# Patient Record
Sex: FEMALE | Race: OTHER | Employment: UNEMPLOYED | URBAN - METROPOLITAN AREA
[De-identification: names, ages, dates, MRNs, and addresses within clinical notes are randomized per-mention and may not be internally consistent; named-entity substitution may affect disease eponyms.]

---

## 2020-11-25 ENCOUNTER — APPOINTMENT (OUTPATIENT)
Dept: CT IMAGING | Age: 45
End: 2020-11-25
Attending: PHYSICIAN ASSISTANT

## 2020-11-25 ENCOUNTER — HOSPITAL ENCOUNTER (EMERGENCY)
Age: 45
Discharge: HOME | End: 2020-11-25
Attending: EMERGENCY MEDICINE

## 2020-11-25 ENCOUNTER — APPOINTMENT (OUTPATIENT)
Dept: ULTRASOUND IMAGING | Age: 45
End: 2020-11-25
Attending: PHYSICIAN ASSISTANT

## 2020-11-25 VITALS
HEART RATE: 88 BPM | TEMPERATURE: 97.6 F | OXYGEN SATURATION: 100 % | HEIGHT: 69 IN | RESPIRATION RATE: 18 BRPM | BODY MASS INDEX: 38.51 KG/M2 | SYSTOLIC BLOOD PRESSURE: 126 MMHG | DIASTOLIC BLOOD PRESSURE: 78 MMHG | WEIGHT: 260 LBS

## 2020-11-25 DIAGNOSIS — N12 PYELONEPHRITIS: ICD-10-CM

## 2020-11-25 DIAGNOSIS — K43.9 VENTRAL HERNIA WITHOUT OBSTRUCTION OR GANGRENE: Primary | ICD-10-CM

## 2020-11-25 DIAGNOSIS — K76.9 HEPATIC LESION: ICD-10-CM

## 2020-11-25 LAB
ALBUMIN SERPL-MCNC: 3.5 G/DL (ref 3.4–5)
ALBUMIN/GLOB SERPL: 0.9 {RATIO} (ref 0.8–1.7)
ALP SERPL-CCNC: 116 U/L (ref 45–117)
ALT SERPL-CCNC: 29 U/L (ref 13–56)
ANION GAP SERPL CALC-SCNC: 8 MMOL/L (ref 3–18)
APPEARANCE UR: CLEAR
AST SERPL-CCNC: 21 U/L (ref 10–38)
BACTERIA URNS QL MICRO: ABNORMAL /HPF
BASOPHILS # BLD: 0.1 K/UL (ref 0–0.1)
BASOPHILS NFR BLD: 1 % (ref 0–2)
BILIRUB SERPL-MCNC: 0.4 MG/DL (ref 0.2–1)
BILIRUB UR QL: NEGATIVE
BUN SERPL-MCNC: 16 MG/DL (ref 7–18)
BUN/CREAT SERPL: 18 (ref 12–20)
CALCIUM SERPL-MCNC: 8.8 MG/DL (ref 8.5–10.1)
CHLORIDE SERPL-SCNC: 108 MMOL/L (ref 100–111)
CO2 SERPL-SCNC: 24 MMOL/L (ref 21–32)
COLOR UR: YELLOW
CREAT SERPL-MCNC: 0.9 MG/DL (ref 0.6–1.3)
DIFFERENTIAL METHOD BLD: ABNORMAL
EOSINOPHIL # BLD: 0.3 K/UL (ref 0–0.4)
EOSINOPHIL NFR BLD: 3 % (ref 0–5)
EPITH CASTS URNS QL MICRO: ABNORMAL /LPF (ref 0–5)
ERYTHROCYTE [DISTWIDTH] IN BLOOD BY AUTOMATED COUNT: 14.2 % (ref 11.6–14.5)
GLOBULIN SER CALC-MCNC: 4.1 G/DL (ref 2–4)
GLUCOSE SERPL-MCNC: 102 MG/DL (ref 74–99)
GLUCOSE UR STRIP.AUTO-MCNC: NEGATIVE MG/DL
HCG UR QL: NEGATIVE
HCT VFR BLD AUTO: 40.4 % (ref 35–45)
HGB BLD-MCNC: 12.9 G/DL (ref 12–16)
HGB UR QL STRIP: ABNORMAL
KETONES UR QL STRIP.AUTO: NEGATIVE MG/DL
LEUKOCYTE ESTERASE UR QL STRIP.AUTO: ABNORMAL
LIPASE SERPL-CCNC: 68 U/L (ref 73–393)
LYMPHOCYTES # BLD: 4 K/UL (ref 0.9–3.6)
LYMPHOCYTES NFR BLD: 34 % (ref 21–52)
MCH RBC QN AUTO: 27.3 PG (ref 24–34)
MCHC RBC AUTO-ENTMCNC: 31.9 G/DL (ref 31–37)
MCV RBC AUTO: 85.4 FL (ref 74–97)
MONOCYTES # BLD: 0.8 K/UL (ref 0.05–1.2)
MONOCYTES NFR BLD: 7 % (ref 3–10)
NEUTS SEG # BLD: 6.6 K/UL (ref 1.8–8)
NEUTS SEG NFR BLD: 55 % (ref 40–73)
NITRITE UR QL STRIP.AUTO: NEGATIVE
PH UR STRIP: 5.5 [PH] (ref 5–8)
PLATELET # BLD AUTO: 325 K/UL (ref 135–420)
PMV BLD AUTO: 11 FL (ref 9.2–11.8)
POTASSIUM SERPL-SCNC: 3.5 MMOL/L (ref 3.5–5.5)
PROT SERPL-MCNC: 7.6 G/DL (ref 6.4–8.2)
PROT UR STRIP-MCNC: NEGATIVE MG/DL
RBC # BLD AUTO: 4.73 M/UL (ref 4.2–5.3)
RBC #/AREA URNS HPF: ABNORMAL /HPF (ref 0–5)
SODIUM SERPL-SCNC: 140 MMOL/L (ref 136–145)
SP GR UR REFRACTOMETRY: 1.02 (ref 1–1.03)
UROBILINOGEN UR QL STRIP.AUTO: 0.2 EU/DL (ref 0.2–1)
WBC # BLD AUTO: 11.7 K/UL (ref 4.6–13.2)
WBC URNS QL MICRO: ABNORMAL /HPF (ref 0–4)

## 2020-11-25 PROCEDURE — 96375 TX/PRO/DX INJ NEW DRUG ADDON: CPT

## 2020-11-25 PROCEDURE — 99284 EMERGENCY DEPT VISIT MOD MDM: CPT

## 2020-11-25 PROCEDURE — 74176 CT ABD & PELVIS W/O CONTRAST: CPT

## 2020-11-25 PROCEDURE — 85025 COMPLETE CBC W/AUTO DIFF WBC: CPT

## 2020-11-25 PROCEDURE — 87086 URINE CULTURE/COLONY COUNT: CPT

## 2020-11-25 PROCEDURE — 81025 URINE PREGNANCY TEST: CPT

## 2020-11-25 PROCEDURE — 80053 COMPREHEN METABOLIC PANEL: CPT

## 2020-11-25 PROCEDURE — 74011250636 HC RX REV CODE- 250/636: Performed by: PHYSICIAN ASSISTANT

## 2020-11-25 PROCEDURE — 96376 TX/PRO/DX INJ SAME DRUG ADON: CPT

## 2020-11-25 PROCEDURE — 83690 ASSAY OF LIPASE: CPT

## 2020-11-25 PROCEDURE — 76705 ECHO EXAM OF ABDOMEN: CPT

## 2020-11-25 PROCEDURE — 96374 THER/PROPH/DIAG INJ IV PUSH: CPT

## 2020-11-25 PROCEDURE — 81001 URINALYSIS AUTO W/SCOPE: CPT

## 2020-11-25 PROCEDURE — 96361 HYDRATE IV INFUSION ADD-ON: CPT

## 2020-11-25 RX ORDER — MORPHINE SULFATE 2 MG/ML
4 INJECTION, SOLUTION INTRAMUSCULAR; INTRAVENOUS
Status: COMPLETED | OUTPATIENT
Start: 2020-11-25 | End: 2020-11-25

## 2020-11-25 RX ORDER — ONDANSETRON 2 MG/ML
4 INJECTION INTRAMUSCULAR; INTRAVENOUS
Status: COMPLETED | OUTPATIENT
Start: 2020-11-25 | End: 2020-11-25

## 2020-11-25 RX ORDER — CEPHALEXIN 500 MG/1
1000 CAPSULE ORAL 2 TIMES DAILY
Qty: 28 CAP | Refills: 0 | Status: SHIPPED | OUTPATIENT
Start: 2020-11-25 | End: 2020-12-02

## 2020-11-25 RX ORDER — ONDANSETRON 4 MG/1
TABLET, ORALLY DISINTEGRATING ORAL
Qty: 10 TAB | Refills: 0 | Status: SHIPPED | OUTPATIENT
Start: 2020-11-25 | End: 2020-12-08

## 2020-11-25 RX ORDER — HYDROCODONE BITARTRATE AND ACETAMINOPHEN 5; 325 MG/1; MG/1
1 TABLET ORAL
Qty: 12 TAB | Refills: 0 | Status: SHIPPED | OUTPATIENT
Start: 2020-11-25 | End: 2020-11-28

## 2020-11-25 RX ADMIN — SODIUM CHLORIDE 1000 ML: 9 INJECTION, SOLUTION INTRAVENOUS at 12:35

## 2020-11-25 RX ADMIN — MORPHINE SULFATE 4 MG: 2 INJECTION, SOLUTION INTRAMUSCULAR; INTRAVENOUS at 14:12

## 2020-11-25 RX ADMIN — ONDANSETRON 4 MG: 2 INJECTION INTRAMUSCULAR; INTRAVENOUS at 12:33

## 2020-11-25 RX ADMIN — MORPHINE SULFATE 4 MG: 2 INJECTION, SOLUTION INTRAMUSCULAR; INTRAVENOUS at 12:34

## 2020-11-25 NOTE — ED PROVIDER NOTES
[facility] EMERGENCY DEPT      Date: 11/25/2020  Patient Name: June Ramos    History of Presenting Illness     Chief Complaint   Patient presents with    Abdominal Pain     41 y.o. female with a past medical history of kidney stones presents the ED via EMS complaining of diffuse abdominal pain, nausea, vomiting since yesterday. She describes having a sharp intermittent pain which is in her diffuse abdomen, more so to the right side, which radiates occasionally to her right flank. Patient was given 4 mg Zofran in route, with much improvement of her nausea. She is unsure if this feels like prior kidney stones. She noes having decreased PO intake over the past 2 days due to her discomfort. Denies any fever, chills, diarrhea, constipation, urinary complaints, vaginal symptoms, other symptoms at this time. Patient denies any other associated signs or symptoms. Patient denies any other complaints. Nursing notes regarding the HPI and triage nursing notes were reviewed. Prior medical records were reviewed. Current Outpatient Medications   Medication Sig Dispense Refill    HYDROcodone-acetaminophen (NORCO) 5-325 mg per tablet Take 1 Tab by mouth every six (6) hours as needed for Pain for up to 3 days. Max Daily Amount: 4 Tabs. 12 Tab 0    ondansetron (Zofran ODT) 4 mg disintegrating tablet Take 1-2 tablets every 6-8 hours as needed for nausea and vomiting. 10 Tab 0    cephALEXin (Keflex) 500 mg capsule Take 2 Caps by mouth two (2) times a day for 7 days. 28 Cap 0       Past History     Past Medical History:  History reviewed. No pertinent past medical history. Past Surgical History:  Past Surgical History:   Procedure Laterality Date    ABDOMEN SURGERY PROC UNLISTED         Family History:  History reviewed. No pertinent family history.     Social History:  Social History     Tobacco Use    Smoking status: Current Every Day Smoker     Packs/day: 0.50    Smokeless tobacco: Never Used   Substance Use Topics    Alcohol use: Not Currently    Drug use: Not Currently       Allergies:  No Known Allergies    Patient's primary care provider (as noted in EPIC):  None    Review of Systems   Constitutional:  Denies malaise, fever, chills. Cardiac:  Denies chest pain or palpitations. Respiratory:  Denies cough, wheezing, difficulty breathing, shortness of breath. GI/ABD:  + Abdominal pain, nausea, vomiting. :  Denies injury, pain, dysuria or urgency. Back:  + right flank pain. Neuro:  Denies headache, LOC, dizziness, neurologic symptoms/deficits/paresthesias. Skin: Denies injury, rash, itching or skin changes. All other systems negative as reviewed. Visit Vitals  /78 (BP 1 Location: Right arm, BP Patient Position: At rest)   Pulse 88   Temp 97.6 °F (36.4 °C)   Resp 18   Ht 5' 9\" (1.753 m)   Wt 117.9 kg (260 lb)   SpO2 100%   BMI 38.40 kg/m²       Patient Vitals for the past 12 hrs:   Temp Pulse Resp BP SpO2   11/25/20 1215 97.6 °F (36.4 °C) 88 18 126/78 100 %       PHYSICAL EXAM:    CONSTITUTIONAL:  Alert, appears mildly uncomfortable; well developed;  well nourished. HEAD:  Normocephalic, atraumatic. EYES:  EOMI. Non-icteric sclera. Normal conjunctiva. ENTM:  Mouth: mucous membranes moist.  NECK:  Supple  RESPIRATORY:  Chest clear, equal breath sounds, good air movement. Without wheezes, rhonchi, rales. CARDIOVASCULAR:  Regular rate and rhythm. No murmurs, rubs, or gallops. GI:  Normal bowel sounds, abdomen soft with diffuse tenderness palpation, more so to RUQ, less so to periumbilical region. Voluntary guarding present. No rebound TTP. BACK: Bilateral CVA tenderness. UPPER EXT:  Normal inspection. NEURO:  Moves all four extremities, and grossly normal motor exam.  SKIN:  No rashes;  Normal for age. PSYCH:  Alert and normal affect.     DIFFERENTIAL DIAGNOSES/ MEDICAL DECISION MAKING:  Gastritis, gerd, peptic ulcer disease, cholecystitis, pancreatitis, gastroenteritis, hepatitis, constipation related pain, appendicitis pain, diverticulitis, urinary tract infection, obstruction, abdominal wall pain, or combination of the above versus many other processes. ED COURSE AND MEDICAL DECISION MAKING:    The patient's pain improved in the ED with the noted medications. On reassessment of the patient, the patient continues to have no surgical abdomen with no rebound nor guarding. The patient does not appear septic by presentation, vital signs and laboratory results. Recent Results (from the past 12 hour(s))   CBC WITH AUTOMATED DIFF    Collection Time: 11/25/20 12:20 PM   Result Value Ref Range    WBC 11.7 4.6 - 13.2 K/uL    RBC 4.73 4.20 - 5.30 M/uL    HGB 12.9 12.0 - 16.0 g/dL    HCT 40.4 35.0 - 45.0 %    MCV 85.4 74.0 - 97.0 FL    MCH 27.3 24.0 - 34.0 PG    MCHC 31.9 31.0 - 37.0 g/dL    RDW 14.2 11.6 - 14.5 %    PLATELET 132 159 - 101 K/uL    MPV 11.0 9.2 - 11.8 FL    NEUTROPHILS 55 40 - 73 %    LYMPHOCYTES 34 21 - 52 %    MONOCYTES 7 3 - 10 %    EOSINOPHILS 3 0 - 5 %    BASOPHILS 1 0 - 2 %    ABS. NEUTROPHILS 6.6 1.8 - 8.0 K/UL    ABS. LYMPHOCYTES 4.0 (H) 0.9 - 3.6 K/UL    ABS. MONOCYTES 0.8 0.05 - 1.2 K/UL    ABS. EOSINOPHILS 0.3 0.0 - 0.4 K/UL    ABS. BASOPHILS 0.1 0.0 - 0.1 K/UL    DF AUTOMATED     METABOLIC PANEL, COMPREHENSIVE    Collection Time: 11/25/20 12:20 PM   Result Value Ref Range    Sodium 140 136 - 145 mmol/L    Potassium 3.5 3.5 - 5.5 mmol/L    Chloride 108 100 - 111 mmol/L    CO2 24 21 - 32 mmol/L    Anion gap 8 3.0 - 18 mmol/L    Glucose 102 (H) 74 - 99 mg/dL    BUN 16 7.0 - 18 MG/DL    Creatinine 0.90 0.6 - 1.3 MG/DL    BUN/Creatinine ratio 18 12 - 20      GFR est AA >60 >60 ml/min/1.73m2    GFR est non-AA >60 >60 ml/min/1.73m2    Calcium 8.8 8.5 - 10.1 MG/DL    Bilirubin, total 0.4 0.2 - 1.0 MG/DL    ALT (SGPT) 29 13 - 56 U/L    AST (SGOT) 21 10 - 38 U/L    Alk.  phosphatase 116 45 - 117 U/L    Protein, total 7.6 6.4 - 8.2 g/dL    Albumin 3.5 3.4 - 5.0 g/dL    Globulin 4.1 (H) 2.0 - 4.0 g/dL    A-G Ratio 0.9 0.8 - 1.7     LIPASE    Collection Time: 11/25/20 12:20 PM   Result Value Ref Range    Lipase 68 (L) 73 - 393 U/L   URINALYSIS W/ RFLX MICROSCOPIC    Collection Time: 11/25/20  1:40 PM   Result Value Ref Range    Color YELLOW      Appearance CLEAR      Specific gravity 1.018 1.005 - 1.030      pH (UA) 5.5 5.0 - 8.0      Protein Negative NEG mg/dL    Glucose Negative NEG mg/dL    Ketone Negative NEG mg/dL    Bilirubin Negative NEG      Blood LARGE (A) NEG      Urobilinogen 0.2 0.2 - 1.0 EU/dL    Nitrites Negative NEG      Leukocyte Esterase SMALL (A) NEG     HCG URINE, QL    Collection Time: 11/25/20  1:40 PM   Result Value Ref Range    HCG urine, QL Negative NEG     URINE MICROSCOPIC ONLY    Collection Time: 11/25/20  1:40 PM   Result Value Ref Range    WBC 4 to 10 0 - 4 /hpf    RBC 21 to 35 0 - 5 /hpf    Epithelial cells 3+ 0 - 5 /lpf    Bacteria 1+ (A) NEG /hpf      Ct Abd Pelv Wo Cont    Result Date: 11/25/2020  EXAM: CT ABDOMEN AND PELVIS (NON-CONTRAST) INDICATION: Abdominal pain radiating to the back COMPARISON: No prior comparison study TECHNIQUE: Axial CT imaging of the abdomen and pelvis was performed without IV or oral contrast as per specific clinician request.  Multiplanar reformats were generated. One or more dose reduction techniques were used on this CT: automated exposure control, adjustment of the mAs and/or kVp according to patient's size, and iterative reconstruction techniques. The specific techniques utilized on this CT exam have been documented in the patient's electronic medical record. Digital Imaging and Communications in Medicine (DICOM) format image data are available to nonaffiliated external healthcare facilities or entities on a secure, media free, reciprocally searchable basis with patient authorization for at least a 12-month period after this study.  _______________ FINDINGS: The lack of oral and IV contrast limits evaluation of the solid organs and bowel. LOWER CHEST: The visualized lung bases are clear. There is no pericardial or pleural effusion. LIVER, BILIARY: Small hepatic hypodense lesions are present, located centrally in the hepatic dome, posteriorly in the hepatic dome, inferiorly in the posterior segment right hepatic lobe, caudate lobe, and in the left hepatic lobe lateral segment where the largest lesion is located. Largest lesion has heterogeneous suggesting cyst. Others are too small to characterize. No abnormal biliary dilation. Gallbladder is unremarkable. PANCREAS: Unremarkable. SPLEEN: Unremarkable. ADRENALS: Unremarkable. KIDNEYS: Unremarkable. LYMPH NODES: No enlarged lymph nodes by size criteria. GASTROINTESTINAL TRACT: The lack of oral contrast limits bowel evaluation. No abnormal bowel dilation or wall thickening. Appendix is within normal limits. PELVIC ORGANS: Urinary bladder is incompletely distended, otherwise unremarkable. Uterus and adnexa are unremarkable. VASCULATURE: Unremarkable. OSSEOUS: Unremarkable. OTHER: Ventral abdominal wall hernia is present in the perivesicle region. Fat and omentum/mesentery is present. No bowel contents are seen. Estimated size of the defect is about 3.1 x 2.7 cm. The hernia sac has larger size, measuring about 5.2 cm AP by 8.7 cm craniocaudal by 7.8 cm width. No internal fluid contents or adjacent inflammatory changes are present. Potentially there is mild stranding within the hernia sac. _______________     IMPRESSION: 1. Prominent sized periumbilical ventral hernia containing fat and omentum/mesentery. No bowel contents are seen. 2. No other evidence of acute solid organ or bowel abnormality. 3. Small hepatic hypodensities some of which have Hounsfield units suggesting cysts. Others are too small to characterize. [facility]    Result Date: 11/25/2020  EXAM: Limited right upper quadrant abdominal ultrasound INDICATION: Right upper quadrant pain.  COMPARISON: None. TECHNIQUE: Real-time abdomen/right upper quadrant sonography in multiple planes was performed with image documentation. Grayscale, color flow Doppler imaging, and velocity spectral waveform analysis of the portal vein was performed (duplex imaging). _______________ FINDINGS: LIVER: Diffusely increased hepatic parenchymal echogenicity is present. There are several cysts noted throughout the liver, largest within the left hepatic lobe measuring approximately 2.3 cm in maximal dimension. No solid-appearing mass lesion Color flow Doppler and velocity spectral waveform analysis of the portal vein shows normal (hepatopedal) direction of flow. [place] BILIARY SYSTEM: No intrahepatic biliary dilatation. Common bile duct is upper limits of normal measuring 0.6 cm. GALLBLADDER: No gallstones or gallbladder wall thickening. No pericholecystic fluid. RIGHT KIDNEY: 11.2 cm in length. No hydronephrosis or renal mass. No visible calculi. PANCREAS: Head and body are unremarkable in appearance though the tail is obscured by overlying bowel gas. IVC: Visualized portions are unremarkable in appearance. OTHER: No free intraperitoneal fluid. _______________     IMPRESSION: 1. No acute sonographic abnormality demonstrated. 2. No evidence of cholelithiasis or biliary ductal dilatation. 3. Diffuse increased hepatic parenchymal echogenicity as can be seen with steatosis. 4. Several small hepatic cysts. .      Patient mostly having right sided abdominal pain onset yesterday with nausea, vomiting, right flank pain. Patient did have bilateral CVA tenderness. She has 4-10 WBCs and 21-35 RBCs in her urine, will cover for pyelonephritis. Urine culture was ordered, Keflex prescription provided. Patient was provided also with Norco and Zofran. She was counseled on her ventral hernia, to return immediately should she develop any worsening including worsening pain, intractable vomiting, fever, chills, any other concerning symptoms.     I discussed this patient with my attending, who agrees with the assessment and plan. Diagnosis:   1. Ventral hernia without obstruction or gangrene    2. Pyelonephritis      Disposition: Discharge    Follow-up Information     Follow up With Specialties Details Why Contact Info    Julia Barroso MD Surgery In 3 days  [address]  [place] [address]  [address] [facility] [place]   In 3 days  [address]  [address] ([facility]) [phone]    [facility] EMERGENCY DEPT Emergency Medicine  Immediately if symptoms worsen [address]  [phone]          Discharge Medication List as of 11/25/2020  3:28 PM      START taking these medications    Details   HYDROcodone-acetaminophen (NORCO) 5-325 mg per tablet Take 1 Tab by mouth every six (6) hours as needed for Pain for up to 3 days. Max Daily Amount: 4 Tabs., Normal, Disp-12 Tab,R-0      ondansetron (Zofran ODT) 4 mg disintegrating tablet Take 1-2 tablets every 6-8 hours as needed for nausea and vomiting., Normal, Disp-10 Tab,R-0      cephALEXin (Keflex) 500 mg capsule Take 2 Caps by mouth two (2) times a day for 7 days. , Normal, Disp-28 Cap,R-0           NIKHIL Chavez

## 2020-11-25 NOTE — ED TRIAGE NOTES
Patient arrived via EMS c/o abdominal pain that radiates to her back, nausea, and vomiting that began yesterday. Received 4mg zofran IV en route.

## 2020-11-25 NOTE — DISCHARGE INSTRUCTIONS
Patient Education        Hernia: Instrucciones de cuidado  Hernia: Care Instructions  Instrucciones de Eliazar Hilt hernia aparece cuando el tejido sobresale a través de irina khadijah débil en la pared de adams vientre. La khadijah de la janet y del ombligo son zonas comunes para irina hernia. Irina hernia también puede aparecer cerca de la khadijah de irina cirugía que tuvo anteriormente. La presión de levantar objetos, estirarse demasiado o toser puede desgarrar la khadijah débil, lo que hace que la hernia sobresalga y cause dolor. Si no puede poner la hernia en adams sitio, el tejido podría quedar atrapado fuera de la pared del vientre. Si la hernia se tuerce y pierde adams aporte de nishant, se hinchará y morirá. A esto se le llama hernia estrangulada. Suele causar [employer] [facility]. Necesita tratamiento de inmediato. Algunas hernias necesitan repararse para prevenir que se estrangulen. Si adams hernia le causa síntomas o es [address] [facility], es posible que necesite [facility]. La atención de seguimiento es irina parte clave de adams tratamiento y seguridad. Asegúrese de hacer y acudir a todas las citas, y llame a adams médico si está teniendo problemas. También es irina buena idea saber los resultados de bobbi exámenes y mantener irina lista de los medicamentos que richelle. ¿Cómo puede cuidarse en el hogar? · Tenga cuidado al levantar objetos pesados. · Mantenga un peso saludable. · No fume. Fumar puede provocar tos, lo cual puede hacer que sobresalga la hernia. Si necesita ayuda para dejar de fumar, hable con adams médico [employer] y medicamentos para dejar de fumar. Éstos pueden aumentar bobbi probabilidades de dejar el hábito para siempre. · Hable con adams médico antes de usar un corsé o un braguero para irina hernia. No se recomiendan estos aparatos para tratar las hernias y a veces pueden hacer más daño que beneficio. Podrían chapin beenaertos Erica Bowen que adams médico piense que un braguero podría funcionar, nic estos casos son poco comunes.   ¿Cuándo debe pedir ayuda? Llame a adams médico ahora mismo o busque atención médica inmediata si:    · Tiene dolor repentino e intenso en la khadijah de la hernia.     · Vomita o tiene náuseas.     · Tiene dolor abdominal y no está pasando gases ni heces.     · No puede empujar adams hernia hacia adams lugar con presión suave cuando se recuesta.     · La piel que cubre la hernia se enrojece o se pone sensible. Preste especial atención a los cambios en adams ami y asegúrese de comunicarse con adams médico si:    · Tiene dolor nuevo o más intenso.     · No mejora daron se esperaba. ¿Dónde puede encontrar más información en inglés? Vaya a [email or web address].[email or web address]/  Lissett Pereira C129 en la búsqueda para aprender más acerca de \"Hernia: Instrucciones de cuidado. \"  Revisado: 15 adelfoil, 2020               Versión del contenido: 12.6  © 9640-8099 Healthwise, Incorporated. Las instrucciones de cuidado fueron adaptadas bajo licencia por [employer] (which disclaims liability or warranty for this information). Si usted tiene [place] afección médica o sobre estas instrucciones, siempre pregunte a adams profesional de ami. Healthwise, Incorporated niega toda garantía o responsabilidad por adams uso de esta información. Patient Education        Infección renal: Instrucciones de cuidado  Kidney Infection: Care Instructions  Instrucciones de cuidado    Irina infección renal (pielonefritis) es un tipo de infección urinaria (UTI, por bobbi siglas en inglés). La mayoría de las UTI son infecciones de la vejiga. Las infecciones renales tienden a enfermar más a las personas que las infecciones de la vejiga. [place] son más graves porque pueden causar daños duraderos si no se tratan con rapidez. La atención de seguimiento es irina parte clave de adams tratamiento y seguridad. Asegúrese de hacer y acudir a todas las citas, y llame a adams médico si está teniendo problemas.  También es irina buena idea [employer] resultados de bobbi exámenes y mantener irina lista de los medicamentos que richelle. ¿Cómo puede cuidarse en el hogar? · [address] No deje de tomarlos por el hecho de sentirse mejor. Debe anna todos los antibióticos hasta terminarlos. · Francine abundante agua, lo suficiente para que adams orina sea de color amarillo elida o transparente daron el agua. [place] puede ayudar a eliminar las bacterias que causan la infección. Si tiene [employer], del corazón o del hígado y tiene que Cheri's líquidos, hable con adams médico antes de aumentar adams consumo. · Orine con frecuencia. Trate de vaciar la vejiga cada vez que orine. · Para aliviar el dolor, tome irina ducha caliente o colóquese irina almohadilla térmica (a baja temperatura) sobre la parte baja del abdomen. Nunca se duerma mientras [place] irina almohadilla térmica. Póngase un paño stallworth entre la almohadilla térmica y la piel. Cómo ayudar a prevenir las infecciones renales  · Francine abundante agua todos los GRASSE. [place] le ayuda a orinar con frecuencia, lo que elimina las bacterias del organismo. Si tiene [employer], del corazón o del hígado y tiene que [place] líquidos, hable con adams médico antes de aumentar adams consumo. · Orine en cuanto sienta la necesidad de hacerlo. No retenga la [employer]. Orine antes de irse a dormir. · Si usted presenta síntomas de infección en la vejiga, daron ardor al orinar u orinar con Dallie Acron a adams médico para que trate el problema antes de que empeore. Si no trata irina infección de vejiga de inmediato, puede extenderse al riñón. · Los hombres deben mantener limpia la punta del pene. Si es melodie, tenga en cuenta estas ideas:  · Orine inmediatamente después de chapin tenido [employer]. · Cámbiese las toallas sanitarias con frecuencia.  Evite el uso de lavados vaginales, los [place] de higiene femenina y otros productos para la higiene femenina que contengan desodorantes. · Después de ir al baño, límpiese de adelante hacia atrás. ¿Cuándo debe pedir ayuda? Llame a adams médico ahora mismo o busque atención médica inmediata si:    · Tiene dolor en aumento en la espalda alka debajo de las [place] se llama dolor en el flanco.     · Tiene fiebre nueva o más jv y escalofríos.     · Tiene vómito o náuseas. Preste especial atención a los cambios en adams ami y asegúrese de comunicarse con adams médico si:    · Los síntomas, daron el ardor al orinar, Paulette [place] a aparecer.     · No está mejorando después de 2 días. ¿Dónde puede encontrar más información en inglés? Vitaliy Corey a http://www.gray.com/  Genevive Carota I168 en la búsqueda para aprender más acerca de \"Infección renal: Instrucciones de cuidado. \"  Revisado: 15 adelfo, 2020               Versión del contenido: 12.6  © 2006-2020 Healthwise, Incorporated. Las instrucciones de cuidado fueron adaptadas bajo licencia por [employer] (which disclaims liability or warranty for this information). Si usted tiene [place] afección médica o sobre estas instrucciones, siempre pregunte a adams profesional de ami. Healthwise, Incorporated niega toda garantía o responsabilidad por adams uso de esta información.

## 2020-11-27 LAB
BACTERIA SPEC CULT: NORMAL
CC UR VC: NORMAL
SERVICE CMNT-IMP: NORMAL

## 2020-11-30 ENCOUNTER — APPOINTMENT (OUTPATIENT)
Dept: CT IMAGING | Age: 45
DRG: 355 | End: 2020-11-30
Attending: EMERGENCY MEDICINE
Payer: MEDICARE

## 2020-11-30 ENCOUNTER — HOSPITAL ENCOUNTER (INPATIENT)
Age: 45
LOS: 1 days | Discharge: HOME | DRG: 355 | End: 2020-12-02
Attending: EMERGENCY MEDICINE | Admitting: SURGERY
Payer: MEDICARE

## 2020-11-30 DIAGNOSIS — K43.0 INCARCERATED INCISIONAL HERNIA: ICD-10-CM

## 2020-11-30 DIAGNOSIS — R10.33 ABDOMINAL PAIN, ACUTE, PERIUMBILICAL: Primary | ICD-10-CM

## 2020-11-30 DIAGNOSIS — K43.9 VENTRAL HERNIA WITHOUT OBSTRUCTION OR GANGRENE: ICD-10-CM

## 2020-11-30 LAB
ALBUMIN SERPL-MCNC: 3.6 G/DL (ref 3.4–5)
ALBUMIN/GLOB SERPL: 0.9 {RATIO} (ref 0.8–1.7)
ALP SERPL-CCNC: 115 U/L (ref 45–117)
ALT SERPL-CCNC: 36 U/L (ref 13–56)
ANION GAP SERPL CALC-SCNC: 6 MMOL/L (ref 3–18)
APPEARANCE UR: CLEAR
AST SERPL-CCNC: 25 U/L (ref 10–38)
BACTERIA URNS QL MICRO: NEGATIVE /HPF
BASOPHILS # BLD: 0.1 K/UL (ref 0–0.1)
BASOPHILS NFR BLD: 1 % (ref 0–2)
BILIRUB SERPL-MCNC: 0.2 MG/DL (ref 0.2–1)
BILIRUB UR QL: NEGATIVE
BUN SERPL-MCNC: 17 MG/DL (ref 7–18)
BUN/CREAT SERPL: 13 (ref 12–20)
CALCIUM SERPL-MCNC: 9.3 MG/DL (ref 8.5–10.1)
CHLORIDE SERPL-SCNC: 107 MMOL/L (ref 100–111)
CO2 SERPL-SCNC: 27 MMOL/L (ref 21–32)
COLOR UR: YELLOW
CREAT SERPL-MCNC: 1.26 MG/DL (ref 0.6–1.3)
DIFFERENTIAL METHOD BLD: ABNORMAL
EOSINOPHIL # BLD: 0.5 K/UL (ref 0–0.4)
EOSINOPHIL NFR BLD: 5 % (ref 0–5)
EPITH CASTS URNS QL MICRO: NORMAL /LPF (ref 0–5)
ERYTHROCYTE [DISTWIDTH] IN BLOOD BY AUTOMATED COUNT: 14.7 % (ref 11.6–14.5)
GLOBULIN SER CALC-MCNC: 4.1 G/DL (ref 2–4)
GLUCOSE SERPL-MCNC: 85 MG/DL (ref 74–99)
GLUCOSE UR STRIP.AUTO-MCNC: NEGATIVE MG/DL
HCG UR QL: NEGATIVE
HCT VFR BLD AUTO: 40.3 % (ref 35–45)
HGB BLD-MCNC: 12.6 G/DL (ref 12–16)
HGB UR QL STRIP: ABNORMAL
KETONES UR QL STRIP.AUTO: ABNORMAL MG/DL
LEUKOCYTE ESTERASE UR QL STRIP.AUTO: NEGATIVE
LIPASE SERPL-CCNC: 70 U/L (ref 73–393)
LYMPHOCYTES # BLD: 4.3 K/UL (ref 0.9–3.6)
LYMPHOCYTES NFR BLD: 40 % (ref 21–52)
MCH RBC QN AUTO: 27.1 PG (ref 24–34)
MCHC RBC AUTO-ENTMCNC: 31.3 G/DL (ref 31–37)
MCV RBC AUTO: 86.7 FL (ref 74–97)
MONOCYTES # BLD: 1.1 K/UL (ref 0.05–1.2)
MONOCYTES NFR BLD: 10 % (ref 3–10)
NEUTS SEG # BLD: 4.8 K/UL (ref 1.8–8)
NEUTS SEG NFR BLD: 44 % (ref 40–73)
NITRITE UR QL STRIP.AUTO: NEGATIVE
PH UR STRIP: 5 [PH] (ref 5–8)
PLATELET # BLD AUTO: 350 K/UL (ref 135–420)
PMV BLD AUTO: 11.6 FL (ref 9.2–11.8)
POTASSIUM SERPL-SCNC: 4.4 MMOL/L (ref 3.5–5.5)
PROT SERPL-MCNC: 7.7 G/DL (ref 6.4–8.2)
PROT UR STRIP-MCNC: NEGATIVE MG/DL
RBC # BLD AUTO: 4.65 M/UL (ref 4.2–5.3)
RBC #/AREA URNS HPF: NORMAL /HPF (ref 0–5)
SODIUM SERPL-SCNC: 140 MMOL/L (ref 136–145)
SP GR UR REFRACTOMETRY: 1.02 (ref 1–1.03)
UROBILINOGEN UR QL STRIP.AUTO: 1 EU/DL (ref 0.2–1)
WBC # BLD AUTO: 10.7 K/UL (ref 4.6–13.2)
WBC URNS QL MICRO: NORMAL /HPF (ref 0–4)

## 2020-11-30 PROCEDURE — 74177 CT ABD & PELVIS W/CONTRAST: CPT

## 2020-11-30 PROCEDURE — 74011000636 HC RX REV CODE- 636: Performed by: EMERGENCY MEDICINE

## 2020-11-30 PROCEDURE — 87086 URINE CULTURE/COLONY COUNT: CPT

## 2020-11-30 PROCEDURE — 99284 EMERGENCY DEPT VISIT MOD MDM: CPT

## 2020-11-30 PROCEDURE — 74011250636 HC RX REV CODE- 250/636: Performed by: EMERGENCY MEDICINE

## 2020-11-30 PROCEDURE — 81001 URINALYSIS AUTO W/SCOPE: CPT

## 2020-11-30 PROCEDURE — 96375 TX/PRO/DX INJ NEW DRUG ADDON: CPT

## 2020-11-30 PROCEDURE — 96374 THER/PROPH/DIAG INJ IV PUSH: CPT

## 2020-11-30 PROCEDURE — 85025 COMPLETE CBC W/AUTO DIFF WBC: CPT

## 2020-11-30 PROCEDURE — 81025 URINE PREGNANCY TEST: CPT

## 2020-11-30 PROCEDURE — 96376 TX/PRO/DX INJ SAME DRUG ADON: CPT

## 2020-11-30 PROCEDURE — 83690 ASSAY OF LIPASE: CPT

## 2020-11-30 PROCEDURE — 80053 COMPREHEN METABOLIC PANEL: CPT

## 2020-11-30 RX ORDER — HYDROMORPHONE HYDROCHLORIDE 1 MG/ML
0.5 INJECTION, SOLUTION INTRAMUSCULAR; INTRAVENOUS; SUBCUTANEOUS
Status: COMPLETED | OUTPATIENT
Start: 2020-11-30 | End: 2020-11-30

## 2020-11-30 RX ORDER — KETOROLAC TROMETHAMINE 30 MG/ML
30 INJECTION, SOLUTION INTRAMUSCULAR; INTRAVENOUS
Status: COMPLETED | OUTPATIENT
Start: 2020-11-30 | End: 2020-11-30

## 2020-11-30 RX ORDER — FAMOTIDINE 10 MG/ML
20 INJECTION INTRAVENOUS
Status: COMPLETED | OUTPATIENT
Start: 2020-11-30 | End: 2020-11-30

## 2020-11-30 RX ADMIN — IOPAMIDOL 100 ML: 612 INJECTION, SOLUTION INTRAVENOUS at 23:04

## 2020-11-30 RX ADMIN — SODIUM CHLORIDE 1000 ML: 900 INJECTION, SOLUTION INTRAVENOUS at 20:59

## 2020-11-30 RX ADMIN — FAMOTIDINE 20 MG: 10 INJECTION, SOLUTION INTRAVENOUS at 21:00

## 2020-11-30 RX ADMIN — HYDROMORPHONE HYDROCHLORIDE 0.5 MG: 1 INJECTION, SOLUTION INTRAMUSCULAR; INTRAVENOUS; SUBCUTANEOUS at 21:00

## 2020-11-30 RX ADMIN — KETOROLAC TROMETHAMINE 30 MG: 30 INJECTION, SOLUTION INTRAMUSCULAR at 21:00

## 2020-11-30 RX ADMIN — HYDROMORPHONE HYDROCHLORIDE 0.5 MG: 1 INJECTION, SOLUTION INTRAMUSCULAR; INTRAVENOUS; SUBCUTANEOUS at 22:26

## 2020-12-01 PROBLEM — R10.33 ABDOMINAL PAIN, ACUTE, PERIUMBILICAL: Status: ACTIVE | Noted: 2020-12-01

## 2020-12-01 PROBLEM — K43.9 VENTRAL HERNIA WITHOUT OBSTRUCTION OR GANGRENE: Status: ACTIVE | Noted: 2020-12-01

## 2020-12-01 LAB
COVID-19 RAPID TEST, COVR: NOT DETECTED
HEALTH STATUS, XMCV2T: NORMAL
SOURCE, COVRS: NORMAL
SPECIMEN TYPE, XMCV1T: NORMAL

## 2020-12-01 PROCEDURE — 74011250636 HC RX REV CODE- 250/636: Performed by: SURGERY

## 2020-12-01 PROCEDURE — 99218 HC RM OBSERVATION: CPT

## 2020-12-01 PROCEDURE — 74011000250 HC RX REV CODE- 250: Performed by: SURGERY

## 2020-12-01 PROCEDURE — 96376 TX/PRO/DX INJ SAME DRUG ADON: CPT

## 2020-12-01 PROCEDURE — 2709999900 HC NON-CHARGEABLE SUPPLY

## 2020-12-01 PROCEDURE — C9113 INJ PANTOPRAZOLE SODIUM, VIA: HCPCS | Performed by: SURGERY

## 2020-12-01 PROCEDURE — 74011000258 HC RX REV CODE- 258: Performed by: SURGERY

## 2020-12-01 PROCEDURE — 96375 TX/PRO/DX INJ NEW DRUG ADDON: CPT

## 2020-12-01 PROCEDURE — 99222 1ST HOSP IP/OBS MODERATE 55: CPT | Performed by: SURGERY

## 2020-12-01 PROCEDURE — 87635 SARS-COV-2 COVID-19 AMP PRB: CPT

## 2020-12-01 RX ORDER — HYDROMORPHONE HYDROCHLORIDE 1 MG/ML
0.5 INJECTION, SOLUTION INTRAMUSCULAR; INTRAVENOUS; SUBCUTANEOUS
Status: DISCONTINUED | OUTPATIENT
Start: 2020-12-01 | End: 2020-12-02 | Stop reason: HOSPADM

## 2020-12-01 RX ORDER — ONDANSETRON 2 MG/ML
4 INJECTION INTRAMUSCULAR; INTRAVENOUS
Status: DISCONTINUED | OUTPATIENT
Start: 2020-12-01 | End: 2020-12-02 | Stop reason: HOSPADM

## 2020-12-01 RX ORDER — KETOROLAC TROMETHAMINE 15 MG/ML
15 INJECTION, SOLUTION INTRAMUSCULAR; INTRAVENOUS
Status: DISCONTINUED | OUTPATIENT
Start: 2020-12-01 | End: 2020-12-02 | Stop reason: HOSPADM

## 2020-12-01 RX ORDER — DEXTROSE, SODIUM CHLORIDE, AND POTASSIUM CHLORIDE 5; .45; .15 G/100ML; G/100ML; G/100ML
50 INJECTION INTRAVENOUS CONTINUOUS
Status: DISCONTINUED | OUTPATIENT
Start: 2020-12-01 | End: 2020-12-02 | Stop reason: HOSPADM

## 2020-12-01 RX ADMIN — HYDROMORPHONE HYDROCHLORIDE 0.5 MG: 1 INJECTION, SOLUTION INTRAMUSCULAR; INTRAVENOUS; SUBCUTANEOUS at 19:38

## 2020-12-01 RX ADMIN — SODIUM CHLORIDE 40 MG: 9 INJECTION, SOLUTION INTRAMUSCULAR; INTRAVENOUS; SUBCUTANEOUS at 22:02

## 2020-12-01 RX ADMIN — POTASSIUM CHLORIDE, DEXTROSE MONOHYDRATE AND SODIUM CHLORIDE 150 ML/HR: 150; 5; 450 INJECTION, SOLUTION INTRAVENOUS at 07:29

## 2020-12-01 RX ADMIN — POTASSIUM CHLORIDE, DEXTROSE MONOHYDRATE AND SODIUM CHLORIDE 50 ML/HR: 150; 5; 450 INJECTION, SOLUTION INTRAVENOUS at 22:02

## 2020-12-01 RX ADMIN — HYDROMORPHONE HYDROCHLORIDE 0.5 MG: 1 INJECTION, SOLUTION INTRAMUSCULAR; INTRAVENOUS; SUBCUTANEOUS at 04:57

## 2020-12-01 RX ADMIN — PIPERACILLIN AND TAZOBACTAM 3.38 G: 3; .375 INJECTION, POWDER, LYOPHILIZED, FOR SOLUTION INTRAVENOUS at 09:23

## 2020-12-01 RX ADMIN — KETOROLAC TROMETHAMINE 15 MG: 15 INJECTION, SOLUTION INTRAMUSCULAR; INTRAVENOUS at 18:58

## 2020-12-01 RX ADMIN — POTASSIUM CHLORIDE, DEXTROSE MONOHYDRATE AND SODIUM CHLORIDE 150 ML/HR: 150; 5; 450 INJECTION, SOLUTION INTRAVENOUS at 00:37

## 2020-12-01 RX ADMIN — PIPERACILLIN AND TAZOBACTAM 3.38 G: 3; .375 INJECTION, POWDER, LYOPHILIZED, FOR SOLUTION INTRAVENOUS at 22:02

## 2020-12-01 RX ADMIN — SODIUM CHLORIDE 40 MG: 9 INJECTION, SOLUTION INTRAMUSCULAR; INTRAVENOUS; SUBCUTANEOUS at 09:19

## 2020-12-01 RX ADMIN — PIPERACILLIN AND TAZOBACTAM 3.38 G: 3; .375 INJECTION, POWDER, LYOPHILIZED, FOR SOLUTION INTRAVENOUS at 15:10

## 2020-12-01 RX ADMIN — HYDROMORPHONE HYDROCHLORIDE 0.5 MG: 1 INJECTION, SOLUTION INTRAMUSCULAR; INTRAVENOUS; SUBCUTANEOUS at 23:03

## 2020-12-01 RX ADMIN — HYDROMORPHONE HYDROCHLORIDE 0.5 MG: 1 INJECTION, SOLUTION INTRAMUSCULAR; INTRAVENOUS; SUBCUTANEOUS at 09:20

## 2020-12-01 NOTE — PROGRESS NOTES
Patient received in bed awake. Patient A&O x4, denies pain and discomfort. NPO after MN. No distress noted. Frequently use items within reach. Bed locked in low position. Call bell within reach and Patient verbalized understanding of use for assistance and needs. 4817- Patient awake and was given Dilaudid 0.5 mg IV for abd pain 8/10. See MAR and pain assessments. Call bell w/in reach. 1106- Patient awake. Reassessment completed, no change in patient condition. Call bell w/in reach. 1510- Patient awake. Reassessment completed, no change in patient condition.  at bedside. Call bell w/in reach.

## 2020-12-01 NOTE — ROUTINE PROCESS
TRANSFER - IN REPORT: 
 
[facility] on Isabelle Jimenez  being received from ED (unit) for routine progression of care Report consisted of patients Situation, Background, Assessment and  
Recommendations(SBAR). Information from the following report(s) SBAR, Kardex, ED Summary, Intake/Output, MAR and Recent Results was reviewed with the receiving nurse. Opportunity for questions and clarification was provided. Assessment completed upon patients arrival to unit and care assumed. Skin assessment completed with Miguel Cook RN. No noted open areas. Alert and oriented times four. Denies pain and discomfort at this time. Noted elevated blood pressure after ambulating to the bed from the wheelchair. Stated that she was cold. Provided blankets.  
 
Faith Krishnamurthy RN, BSN

## 2020-12-01 NOTE — PROGRESS NOTES
Problem: Falls - Risk of  Goal: *Absence of Falls  Description: Document Sissy Navarroasin Fall Risk and appropriate interventions in the flowsheet.   Outcome: Progressing Towards Goal  Note: Fall Risk Interventions:            Medication Interventions: Evaluate medications/consider consulting pharmacy, Patient to call before getting OOB, Teach patient to arise slowly                   Problem: Patient Education: Go to Patient Education Activity  Goal: Patient/Family Education  Outcome: Progressing Towards Goal     Problem: Pain  Goal: *Control of Pain  Outcome: Progressing Towards Goal     Problem: Surgical Pathway Post-Op Day 1  Goal: Activity/Safety  Outcome: Progressing Towards Goal

## 2020-12-01 NOTE — ED PROVIDER NOTES
Hector Arellano is a 41 y.o. female returns for worsening mid abdominal pain. Patient was here on the 25th for abdominal pain was noted to have a fat-containing ventral hernia on CT and was treated for kidney infection as well. Patient states she still having frequency and urgency and mid and upper abdominal pain along with back pain as well. Patient did vomit once today. She denies any fever or diarrhea. She has passed gas and had a bowel movement without difficulty. No blood in her stool or melena. She has had no cough or chest pain. She took some Zofran today without relief. She has been taking the antibiotics as well. Patient has remote history of umbilical hernia repair in the past.  The pain is sharp and stabbing and worse with movement and palpation. The history is provided by the patient. No past medical history on file. Past Surgical History:   Procedure Laterality Date    ABDOMEN SURGERY PROC UNLISTED           No family history on file.     Social History     Socioeconomic History    Marital status:      Spouse name: Not on file    Number of children: Not on file    Years of education: Not on file    Highest education level: Not on file   Occupational History    Not on file   Social Needs    Financial resource strain: Not on file    Food insecurity     Worry: Not on file     Inability: Not on file    Transportation needs     Medical: Not on file     Non-medical: Not on file   Tobacco Use    Smoking status: Current Every Day Smoker     Packs/day: 0.50    Smokeless tobacco: Never Used   Substance and Sexual Activity    Alcohol use: Not Currently    Drug use: Not Currently    Sexual activity: Yes   Lifestyle    Physical activity     Days per week: Not on file     Minutes per session: Not on file    Stress: Not on file   Relationships    Social connections     Talks on phone: Not on file     Gets together: Not on file     Attends Qu[employer] service: Not on file Active member of club or organization: Not on file     Attends meetings of clubs or organizations: Not on file     Relationship status: Not on file    Intimate partner violence     Fear of current or ex partner: Not on file     Emotionally abused: Not on file     Physically abused: Not on file     Forced sexual activity: Not on file   Other Topics Concern    Not on file   Social History Narrative    Not on file         ALLERGIES: Patient has no known allergies. Review of Systems   Constitutional: Positive for appetite change. Negative for fever. HENT: Negative for sore throat. Eyes: Negative for visual disturbance. Respiratory: Negative for cough and shortness of breath. Cardiovascular: Negative for chest pain. Gastrointestinal: Positive for abdominal distention, abdominal pain, nausea and vomiting. Negative for blood in stool, constipation and diarrhea. Endocrine: Negative for polyuria. Genitourinary: Positive for dysuria and frequency. Negative for difficulty urinating and hematuria. Musculoskeletal: Positive for back pain. Negative for gait problem. Skin: Negative for rash. Allergic/Immunologic: Negative for immunocompromised state. Neurological: Negative for syncope. Psychiatric/Behavioral: Positive for sleep disturbance. Vitals:    11/30/20 2145 11/30/20 2200 11/30/20 2215 11/30/20 2230   BP: 134/84 128/82 (!) 148/99 (!) 133/93   Pulse:       Resp:       Temp:       SpO2: 97% 98% 100% 94%   Weight:       Height:                Physical Exam  Vitals signs and nursing note reviewed. Constitutional:       General: She is in acute distress. Appearance: She is well-developed. She is obese. She is ill-appearing. She is not toxic-appearing or diaphoretic. HENT:      Head: Normocephalic and atraumatic.       Right Ear: External ear normal.      Left Ear: External ear normal.      Nose: Nose normal.      Mouth/Throat:      Mouth: Mucous membranes are moist.      Pharynx: Uvula midline. Eyes:      General: No scleral icterus. Conjunctiva/sclera: Conjunctivae normal.   Neck:      Musculoskeletal: Neck supple. Cardiovascular:      Rate and Rhythm: Normal rate and regular rhythm. Heart sounds: Normal heart sounds. Pulmonary:      Effort: Pulmonary effort is normal.      Breath sounds: Normal breath sounds. Abdominal:      General: Abdomen is protuberant. There is distension. Palpations: Abdomen is soft. There is no hepatomegaly or splenomegaly. Tenderness: There is abdominal tenderness in the epigastric area and periumbilical area. There is right CVA tenderness and guarding. There is no left CVA tenderness or rebound. Negative signs include Smith's sign and McBurney's sign. Hernia: A hernia is present. Hernia is present in the ventral area. Musculoskeletal:      Right lower leg: No edema. Left lower leg: No edema. Skin:     General: Skin is warm and dry. Capillary Refill: Capillary refill takes less than 2 seconds. Neurological:      Mental Status: She is alert and oriented to person, place, and time.       Gait: Gait normal.   Psychiatric:         Behavior: Behavior normal.          MDM       Procedures    Vitals:  Patient Vitals for the past 12 hrs:   Temp Pulse Resp BP SpO2   11/30/20 2230    (!) 133/93 94 %   11/30/20 2215    (!) 148/99 100 %   11/30/20 2200    128/82 98 %   11/30/20 2145    134/84 97 %   11/30/20 2130    130/77 96 %   11/30/20 2120    (!) 141/83 97 %   11/30/20 2115     97 %   11/30/20 2024 98 °F (36.7 °C) 88 20 114/65 100 %         Medications ordered:   Medications   dextrose 5% - 0.45% NaCl with KCl 20 mEq/L infusion (has no administration in time range)   HYDROmorphone (DILAUDID) syringe 0.5 mg (has no administration in time range)   ondansetron (ZOFRAN) injection 4 mg (has no administration in time range)   ketorolac (TORADOL) injection 15 mg (has no administration in time range)   sodium chloride 0.9 % bolus infusion 1,000 mL (1,000 mL IntraVENous New Bag 11/30/20 2059)   sodium chloride 0.9 % bolus infusion 1,000 mL (1,000 mL IntraVENous New Bag 11/30/20 2059)   ketorolac (TORADOL) injection 30 mg (30 mg IntraVENous Given 11/30/20 2100)   famotidine (PF) (PEPCID) injection 20 mg (20 mg IntraVENous Given 11/30/20 2100)   HYDROmorphone (DILAUDID) syringe 0.5 mg (0.5 mg IntraVENous Given 11/30/20 2100)   HYDROmorphone (DILAUDID) syringe 0.5 mg (0.5 mg IntraVENous Given 11/30/20 2226)   iopamidoL (ISOVUE 300) 61 % contrast injection 100 mL (100 mL IntraVENous Given 11/30/20 2304)         Lab findings:  Recent Results (from the past 12 hour(s))   URINALYSIS W/ RFLX MICROSCOPIC    Collection Time: 11/30/20  8:27 PM   Result Value Ref Range    Color YELLOW      Appearance CLEAR      Specific gravity 1.024 1.005 - 1.030      pH (UA) 5.0 5.0 - 8.0      Protein Negative NEG mg/dL    Glucose Negative NEG mg/dL    Ketone TRACE (A) NEG mg/dL    Bilirubin Negative NEG      Blood LARGE (A) NEG      Urobilinogen 1.0 0.2 - 1.0 EU/dL    Nitrites Negative NEG      Leukocyte Esterase Negative NEG     HCG URINE, QL    Collection Time: 11/30/20  8:27 PM   Result Value Ref Range    HCG urine, QL Negative NEG     URINE MICROSCOPIC ONLY    Collection Time: 11/30/20  8:27 PM   Result Value Ref Range    WBC 0 to 3 0 - 4 /hpf    RBC 4 to 10 0 - 5 /hpf    Epithelial cells 3+ 0 - 5 /lpf    Bacteria Negative NEG /hpf   CBC WITH AUTOMATED DIFF    Collection Time: 11/30/20  8:50 PM   Result Value Ref Range    WBC 10.7 4.6 - 13.2 K/uL    RBC 4.65 4.20 - 5.30 M/uL    HGB 12.6 12.0 - 16.0 g/dL    HCT 40.3 35.0 - 45.0 %    MCV 86.7 74.0 - 97.0 FL    MCH 27.1 24.0 - 34.0 PG    MCHC 31.3 31.0 - 37.0 g/dL    RDW 14.7 (H) 11.6 - 14.5 %    PLATELET 104 573 - 699 K/uL    MPV 11.6 9.2 - 11.8 FL    NEUTROPHILS 44 40 - 73 %    LYMPHOCYTES 40 21 - 52 %    MONOCYTES 10 3 - 10 %    EOSINOPHILS 5 0 - 5 %    BASOPHILS 1 0 - 2 %    ABS.  NEUTROPHILS 4. 8 1.8 - 8.0 K/UL    ABS. LYMPHOCYTES 4.3 (H) 0.9 - 3.6 K/UL    ABS. MONOCYTES 1.1 0.05 - 1.2 K/UL    ABS. EOSINOPHILS 0.5 (H) 0.0 - 0.4 K/UL    ABS. BASOPHILS 0.1 0.0 - 0.1 K/UL    DF AUTOMATED     METABOLIC PANEL, COMPREHENSIVE    Collection Time: 11/30/20  8:50 PM   Result Value Ref Range    Sodium 140 136 - 145 mmol/L    Potassium 4.4 3.5 - 5.5 mmol/L    Chloride 107 100 - 111 mmol/L    CO2 27 21 - 32 mmol/L    Anion gap 6 3.0 - 18 mmol/L    Glucose 85 74 - 99 mg/dL    BUN 17 7.0 - 18 MG/DL    Creatinine 1.26 0.6 - 1.3 MG/DL    BUN/Creatinine ratio 13 12 - 20      GFR est AA 56 (L) >60 ml/min/1.73m2    GFR est non-AA 46 (L) >60 ml/min/1.73m2    Calcium 9.3 8.5 - 10.1 MG/DL    Bilirubin, total 0.2 0.2 - 1.0 MG/DL    ALT (SGPT) 36 13 - 56 U/L    AST (SGOT) 25 10 - 38 U/L    Alk. phosphatase 115 45 - 117 U/L    Protein, total 7.7 6.4 - 8.2 g/dL    Albumin 3.6 3.4 - 5.0 g/dL    Globulin 4.1 (H) 2.0 - 4.0 g/dL    A-G Ratio 0.9 0.8 - 1.7     LIPASE    Collection Time: 11/30/20  8:50 PM   Result Value Ref Range    Lipase 70 (L) 73 - 393 U/L       EKG interpretation by ED Physician:      X-Ray, CT or other radiology findings or impressions:  CT ABD PELV W CONT    (Results Pending)   Ventral abdominal wall hernia containing fat and omentum is similar size to the 11/25/20 exam. There is mildly increased stranding withing the hernia sac, but no internal fluid and no bowel loops protruding in. No evidence for obstruction. Other chronic or incidental findings will be described in final report. Progress notes, Consult notes or additional Procedure notes:   10:20 PM  Patient still having a significant pain and is significantly tender in the periumbilical area. Hernia feels reducible. Rest abdomen is soft with no rigidity. Labs are unremarkable. No evidence of UTI.   We will get repeat CT to evaluate for possible incarceration or strangulation    Patient is more comfortable still has a moderate amount of pain on exam. Discussed with the general surgeon on-call Dr. Elliot Thornton who will admit but is concerned about performing surgery right away with the formation of the mesentery. It was discussed with the patient that surgery may not occur tomorrow but may be delayed for 1 or 2 weeks but she is willing to be admitted at this time    Reevaluation of patient:   stable    Disposition:  Diagnosis:   1. Abdominal pain, acute, periumbilical    2. Ventral hernia without obstruction or gangrene        Disposition: admit    Follow-up Information    None           Patient's Medications   Start Taking    No medications on file   Continue Taking    CEPHALEXIN (KEFLEX) 500 MG CAPSULE    Take 2 Caps by mouth two (2) times a day for 7 days. ONDANSETRON (ZOFRAN ODT) 4 MG DISINTEGRATING TABLET    Take 1-2 tablets every 6-8 hours as needed for nausea and vomiting.    These Medications have changed    No medications on file   Stop Taking    No medications on file

## 2020-12-01 NOTE — PROGRESS NOTES
Problem: Discharge Planning  Goal: *Discharge to safe environment  Outcome: Progressing Towards Goal   Plan home    Reason for Admission:   Hernia repair                   RUR Score:  n/a                   Plan for utilizing home health:      no    PCP: First and Last name:  none   Name of Practice:    Are you a current patient: Yes/No:    Approximate date of last visit:    Can you participate in a virtual visit with your PCP: yes                    Current Advanced Directive/Advance Care Plan: none, does not want                         Transition of Care Plan:   Spoke with pt,lives with spouse. States she is independent with adls and amb. No dme  Demographics correct. Spouse to transport home      Patient has designated ________spouse________________ to participate in his/her discharge plan and to receive any needed information. Name: roxi june  Address:  Phone number: [phone]    Patient and/or next of kin has been given and has signed the [facility] Outpatient Observation  Notification letter and all questions answered. Copy of this notice given to patient and copy placed on chart. Patient and/or next of kin has been given the Outpatient Observation Information and Notification letter and all questions answered. Care Management Interventions  PCP Verified by CM: Yes  Palliative Care Criteria Met (RRAT>21 & CHF Dx)?: No  Mode of Transport at Discharge:  Other (see comment)  Transition of Care Consult (CM Consult): Discharge Planning  Discharge Durable Medical Equipment: No  Physical Therapy Consult: No  Occupational Therapy Consult: No  Speech Therapy Consult: No  Current Support Network: Lives with Spouse  Confirm Follow Up Transport: Family  The Patient and/or Patient Representative was Provided with a Choice of Provider and Agrees with the Discharge Plan?: No  Freedom of Choice List was Provided with Basic Dialogue that Supports the Patient's Individualized Plan of Care/Goals, Treatment Preferences and Shares the Quality Data Associated with the Providers?: No  Discharge Location  Discharge Placement: Home

## 2020-12-01 NOTE — PROGRESS NOTES
conducted an initial consultation and Spiritual Assessment for Anais Tello, who is a 41 y.o.,female. Patients Primary Language is: Malaysian. According to the patients EMR [employer] Affiliation is: [employer]. The reason the Patient came to the hospital is:   Patient Active Problem List    Diagnosis Date Noted    Ventral hernia without obstruction or gangrene 12/01/2020    Abdominal pain, acute, periumbilical 43/24/1610        The  provided the following Interventions:  Initiated a relationship of care and support with patient in room 2402 this morning. Listened empathically as she talked about her being here and how she will be having some surgery either later today or possibly tomorrow. Patient seems to be in good spirits and speaks limited english but did alright with this visit. Provided information about Spiritual Care Services. Offered prayer and assurance of continued prayers on patients behalf. Chart reviewed. The following outcomes were achieved:  Patient shared limited information about her medical narrative and spiritual journey/beliefs. Patient processed feeling about current hospitalization. Patient expressed gratitude for pastoral care visit. Assessment:  Patient does not have any Orthodoxy/cultural needs that will affect patients preferences in health care. There are no further spiritual or Orthodoxy issues which require Spiritual Care Services interventions at this time. Plan:  Chaplains will continue to follow and will provide pastoral care on an as needed/requested basis    . Maximo Brunner   Spiritual Care   [phone]

## 2020-12-01 NOTE — ROUTINE PROCESS
Bedside and Verbal shift change report given to Kamaljit Bernstein RN, BSN (oncoming nurse) by Simeon Vizcaino RN, BSN (offgoing nurse). Report included the following information SBAR, Kardex, ED Summary, Intake/Output, MAR and Recent Results.   
 
Simeon Vizcaino RN, BSN

## 2020-12-01 NOTE — PROGRESS NOTES
Problem: Falls - Risk of  Goal: *Absence of Falls  Description: Document Ivin Hernandes Fall Risk and appropriate interventions in the flowsheet. Outcome: Progressing Towards Goal  Note: Fall Risk Interventions:            Medication Interventions: Teach patient to arise slowly, Patient to call before getting OOB                   Problem: Patient Education: Go to Patient Education Activity  Goal: Patient/Family Education  Outcome: Progressing Towards Goal     Problem: Pain  Goal: *Control of Pain  Outcome: Progressing Towards Goal     Problem: Discharge Planning  Goal: *Discharge to safe environment  Outcome: Progressing Towards Goal  Goal: *Knowledge of medication management  Outcome: Progressing Towards Goal  Goal: *Knowledge of discharge instructions  Outcome: Progressing Towards Goal     Problem: Surgical Pathway Day of Surgery  Goal: Off Pathway (Use only if patient is Off Pathway)  Outcome: Progressing Towards Goal  Goal: Activity/Safety  Outcome: Progressing Towards Goal  Goal: Consults, if ordered  Outcome: Progressing Towards Goal  Goal: Nutrition/Diet  Outcome: Progressing Towards Goal  Goal: Medications  Outcome: Progressing Towards Goal  Goal: Respiratory  Outcome: Progressing Towards Goal  Goal: Treatments/Interventions/Procedures  Outcome: Progressing Towards Goal  Goal: Psychosocial  Outcome: Progressing Towards Goal  Goal: *No signs and symptoms of infection or wound complications  Outcome: Progressing Towards Goal  Goal: *Optimal pain control at patient's stated goal  Outcome: Progressing Towards Goal  Goal: *Adequate urinary output (equal to or greater than 30 milliliters/hour)  Description: Ambulatory Surgery patients voiding without difficulty.   Outcome: Progressing Towards Goal  Goal: *Hemodynamically stable  Outcome: Progressing Towards Goal  Goal: *Tolerating diet  Outcome: Progressing Towards Goal  Goal: *Demonstrates progressive activity  Outcome: Progressing Towards Goal     Problem: Surgical Pathway Post-Op Day 1  Goal: Off Pathway (Use only if patient is Off Pathway)  Outcome: Progressing Towards Goal  Goal: Activity/Safety  Outcome: Progressing Towards Goal  Goal: Diagnostic Test/Procedures  Outcome: Progressing Towards Goal  Goal: Nutrition/Diet  Outcome: Progressing Towards Goal  Goal: Discharge Planning  Outcome: Progressing Towards Goal  Goal: Medications  Outcome: Progressing Towards Goal  Goal: Respiratory  Outcome: Progressing Towards Goal  Goal: Treatments/Interventions/Procedures  Outcome: Progressing Towards Goal  Goal: Psychosocial  Outcome: Progressing Towards Goal  Goal: *No signs and symptoms of infection or wound complications  Outcome: Progressing Towards Goal  Goal: *Optimal pain control at patient's stated goal  Outcome: Progressing Towards Goal  Goal: *Adequate urinary output (equal to or greater than 30 milliliters/hour)  Outcome: Progressing Towards Goal  Goal: *Hemodynamically stable  Outcome: Progressing Towards Goal  Goal: *Tolerating diet  Outcome: Progressing Towards Goal  Goal: *Demonstrates progressive activity  Outcome: Progressing Towards Goal  Goal: *Lungs clear or at baseline  Outcome: Progressing Towards Goal     Problem: Surgical Pathway Post-Op Day 2 through Discharge  Goal: Off Pathway (Use only if patient is Off Pathway)  Outcome: Progressing Towards Goal  Goal: Activity/Safety  Outcome: Progressing Towards Goal  Goal: Nutrition/Diet  Outcome: Progressing Towards Goal  Goal: Discharge Planning  Outcome: Progressing Towards Goal  Goal: Medications  Outcome: Progressing Towards Goal  Goal: Respiratory  Outcome: Progressing Towards Goal  Goal: Treatments/Interventions/Procedures  Outcome: Progressing Towards Goal  Goal: Psychosocial  Outcome: Progressing Towards Goal  Goal: *No signs and symptoms of infection or wound complications  Outcome: Progressing Towards Goal  Goal: *Optimal pain control at patient's stated goal  Outcome: Progressing Towards Goal  Goal: *Adequate urinary output (equal to or greater than 30 milliliters/hour)  Outcome: Progressing Towards Goal  Goal: *Hemodynamically stable  Outcome: Progressing Towards Goal  Goal: *Tolerating diet  Outcome: Progressing Towards Goal  Goal: *Demonstrates progressive activity  Outcome: Progressing Towards Goal  Goal: *Lungs clear or at baseline  Outcome: Progressing Towards Goal     Problem: Surgical Pathway: Discharge Outcomes  Goal: *Hemodynamically stable  Outcome: Progressing Towards Goal  Goal: *Lungs clear or at baseline  Outcome: Progressing Towards Goal  Goal: *Demonstrates independent activity or return to baseline  Outcome: Progressing Towards Goal  Goal: *Optimal pain control at patient's stated goal  Outcome: Progressing Towards Goal  Goal: *Verbalizes understanding and describes prescribed diet  Outcome: Progressing Towards Goal  Goal: *Tolerating diet  Outcome: Progressing Towards Goal  Goal: *Verbalizes name, dosage, time, side effects, and number of days to continue medications  Outcome: Progressing Towards Goal  Goal: *No signs and symptoms of infection or wound complications  Outcome: Progressing Towards Goal  Goal: *Anxiety reduced or absent  Outcome: Progressing Towards Goal  Goal: *Understands and describes signs and symptoms to report to providers(Stroke Metric)  Outcome: Progressing Towards Goal  Goal: *Describes follow-up/return visits to physicians  Outcome: Progressing Towards Goal  Goal: *Describes available resources and support systems  Outcome: Progressing Towards Goal

## 2020-12-01 NOTE — H&P
General Surgery Consult      Bia Botello  Admit date: 2020    MRN: [id number]     : 1975     Age: 41 y.o. Attending Physician: Kourtney Saez MD [facility]      History of Present Illness:     Bia Botello is a 41 y.o. female who is presenting with a challenging clinical scenario. The patient has stated that 1 year ago she underwent an open umbilical hernia repair at a hospital [facility] [place] which I am assuming is [facility]. She said that the surgeon told her that they placed a mesh and the surgery was done open and she said that she seen the surgeon couple weeks after the surgery but has developed a new bulge at the site of the hernia but she said she did not follow-up with the surgeon after that. Since then the patient has been complaining of abdominal pain that she said is not only localized at the site of the hernia but also generalized including the lower abdomen and epigastric area. 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 She presented to the emergency room the first time about 1 week ago on , and at that point she had a CT scan that showed her recurrent incisional hernia at the umbilicus with omentum inside the hernia sac but there was no evidence of stranding and her vitals were normal so she was discharged home. However the patient stated that her pain continued and it got worse and she presented again last night to the emergency room with the same symptoms. Also her vitals were also normal as well as her WBC but her CT scan showed fat-containing umbilical hernia but with increased stranding of the herniated omentum and fat. There is no evidence of any bowel or intra-abdominal inflammatory changes.   Because of the patient's significant abdominal pain that did not get better with pain medication I was asked to admit the patient for further evaluation and decision if she will need urgent surgery. When I saw the patient today she stated that she still having the pain that is localized mainly at the umbilicus but also in the suprapubic and epigastric area as well. She denies any nausea or vomiting or any constipation. The patient is morbidly obese but does not have any significant medical problems. Patient Active Problem List    Diagnosis Date Noted    Ventral hernia without obstruction or gangrene 12/01/2020    Abdominal pain, acute, periumbilical 03/17/9896     No past medical history on file. Past Surgical History:   Procedure Laterality Date    ABDOMEN SURGERY PROC UNLISTED        Social History     Tobacco Use    Smoking status: Current Every Day Smoker     Packs/day: 0.50    Smokeless tobacco: Never Used   Substance Use Topics    Alcohol use: Not Currently      Social History     Tobacco Use   Smoking Status Current Every Day Smoker    Packs/day: 0.50   Smokeless Tobacco Never Used     No family history on file.    Current Facility-Administered Medications   Medication Dose Route Frequency    dextrose 5% - 0.45% NaCl with KCl 20 mEq/L infusion  50 mL/hr IntraVENous CONTINUOUS    HYDROmorphone (DILAUDID) syringe 0.5 mg  0.5 mg IntraVENous Q3H PRN    ondansetron (ZOFRAN) injection 4 mg  4 mg IntraVENous Q6H PRN    ketorolac (TORADOL) injection 15 mg  15 mg IntraVENous Q6H PRN    influenza vaccine 2020-21 (6 mos+)(PF) (FLUARIX/FLULAVAL/FLUZONE QUAD) injection 0.5 mL  0.5 mL IntraMUSCular PRIOR TO DISCHARGE      No Known Allergies     Review of Systems:  Constitutional: negative  Eyes: negative  Ears, Nose, Mouth, Throat, and Face: negative  Respiratory: negative  Cardiovascular: negative  Gastrointestinal: positive for abdominal pain  Genitourinary:negative  Integument/Breast: negative  Hematologic/Lymphatic: negative  Musculoskeletal:negative  Neurological: negative  Behavioral/Psychiatric: negative  Endocrine: negative  Allergic/Immunologic: negative    Objective:     Visit Vitals  BP (!) 141/94 (BP 1 Location: Right arm, BP Patient Position: At rest)   Pulse 64   Temp 97.5 °F (36.4 °C)   Resp 18   Ht 5' 9\" (1.753 m)   Wt 108.9 kg (240 lb)   SpO2 100%   BMI 35.44 kg/m²       Physical Exam:      General:  in no apparent distress, alert, oriented times 3, afebrile, normal vitals and cooperative   Eyes:  conjunctivae and sclerae normal, pupils equal, round, reactive to light   Throat & Neck: no erythema or exudates noted and neck supple and symmetrical; no palpable masses   Lungs:   clear to auscultation bilaterally   Heart:  Regular rate and rhythm   Abdomen:   rounded and protuberant, soft, non tender except for localized tenderness at a scar located at the umbilicus consistent with her recurrent incisional hernia, nondistended, no masses or organomegaly   Extremities: extremities normal, atraumatic, no cyanosis or edema   Skin: Normal.       Imaging and Lab Review:     CBC:   Lab Results   Component Value Date/Time    WBC 10.7 11/30/2020 08:50 PM    RBC 4.65 11/30/2020 08:50 PM    HGB 12.6 11/30/2020 08:50 PM    HCT 40.3 11/30/2020 08:50 PM    PLATELET 778 44/81/5953 08:50 PM     BMP:   Lab Results   Component Value Date/Time    Glucose 85 11/30/2020 08:50 PM    Sodium 140 11/30/2020 08:50 PM    Potassium 4.4 11/30/2020 08:50 PM    Chloride 107 11/30/2020 08:50 PM    CO2 27 11/30/2020 08:50 PM    BUN 17 11/30/2020 08:50 PM    Creatinine 1.26 11/30/2020 08:50 PM    Calcium 9.3 11/30/2020 08:50 PM     CMP:  Lab Results   Component Value Date/Time    Glucose 85 11/30/2020 08:50 PM    Sodium 140 11/30/2020 08:50 PM    Potassium 4.4 11/30/2020 08:50 PM    Chloride 107 11/30/2020 08:50 PM    CO2 27 11/30/2020 08:50 PM    BUN 17 11/30/2020 08:50 PM    Creatinine 1.26 11/30/2020 08:50 PM    Calcium 9.3 11/30/2020 08:50 PM    Anion gap 6 11/30/2020 08:50 PM    BUN/Creatinine ratio 13 11/30/2020 08:50 PM    Alk.  phosphatase 115 11/30/2020 08:50 PM    Protein, total 7.7 11/30/2020 08:50 PM    Albumin 3.6 11/30/2020 08:50 PM    Globulin 4.1 (H) 11/30/2020 08:50 PM    A-G Ratio 0.9 11/30/2020 08:50 PM       Recent Results (from the past 24 hour(s))   URINALYSIS W/ RFLX MICROSCOPIC    Collection Time: 11/30/20  8:27 PM   Result Value Ref Range    Color YELLOW      Appearance CLEAR      Specific gravity 1.024 1.005 - 1.030      pH (UA) 5.0 5.0 - 8.0      Protein Negative NEG mg/dL    Glucose Negative NEG mg/dL    Ketone TRACE (A) NEG mg/dL    Bilirubin Negative NEG      Blood LARGE (A) NEG      Urobilinogen 1.0 0.2 - 1.0 EU/dL    Nitrites Negative NEG      Leukocyte Esterase Negative NEG     HCG URINE, QL    Collection Time: 11/30/20  8:27 PM   Result Value Ref Range    HCG urine, QL Negative NEG     URINE MICROSCOPIC ONLY    Collection Time: 11/30/20  8:27 PM   Result Value Ref Range    WBC 0 to 3 0 - 4 /hpf    RBC 4 to 10 0 - 5 /hpf    Epithelial cells 3+ 0 - 5 /lpf    Bacteria Negative NEG /hpf   CBC WITH AUTOMATED DIFF    Collection Time: 11/30/20  8:50 PM   Result Value Ref Range    WBC 10.7 4.6 - 13.2 K/uL    RBC 4.65 4.20 - 5.30 M/uL    HGB 12.6 12.0 - 16.0 g/dL    HCT 40.3 35.0 - 45.0 %    MCV 86.7 74.0 - 97.0 FL    MCH 27.1 24.0 - 34.0 PG    MCHC 31.3 31.0 - 37.0 g/dL    RDW 14.7 (H) 11.6 - 14.5 %    PLATELET 746 388 - 579 K/uL    MPV 11.6 9.2 - 11.8 FL    NEUTROPHILS 44 40 - 73 %    LYMPHOCYTES 40 21 - 52 %    MONOCYTES 10 3 - 10 %    EOSINOPHILS 5 0 - 5 %    BASOPHILS 1 0 - 2 %    ABS. NEUTROPHILS 4.8 1.8 - 8.0 K/UL    ABS. LYMPHOCYTES 4.3 (H) 0.9 - 3.6 K/UL    ABS. MONOCYTES 1.1 0.05 - 1.2 K/UL    ABS. EOSINOPHILS 0.5 (H) 0.0 - 0.4 K/UL    ABS.  BASOPHILS 0.1 0.0 - 0.1 K/UL    DF AUTOMATED     METABOLIC PANEL, COMPREHENSIVE    Collection Time: 11/30/20  8:50 PM   Result Value Ref Range    Sodium 140 136 - 145 mmol/L    Potassium 4.4 3.5 - 5.5 mmol/L    Chloride 107 100 - 111 mmol/L    CO2 27 21 - 32 mmol/L    Anion gap 6 3.0 - 18 mmol/L    Glucose 85 74 - 99 mg/dL BUN 17 7.0 - 18 MG/DL    Creatinine 1.26 0.6 - 1.3 MG/DL    BUN/Creatinine ratio 13 12 - 20      GFR est AA 56 (L) >60 ml/min/1.73m2    GFR est non-AA 46 (L) >60 ml/min/1.73m2    Calcium 9.3 8.5 - 10.1 MG/DL    Bilirubin, total 0.2 0.2 - 1.0 MG/DL    ALT (SGPT) 36 13 - 56 U/L    AST (SGOT) 25 10 - 38 U/L    Alk. phosphatase 115 45 - 117 U/L    Protein, total 7.7 6.4 - 8.2 g/dL    Albumin 3.6 3.4 - 5.0 g/dL    Globulin 4.1 (H) 2.0 - 4.0 g/dL    A-G Ratio 0.9 0.8 - 1.7     LIPASE    Collection Time: 11/30/20  8:50 PM   Result Value Ref Range    Lipase 70 (L) 73 - 393 U/L   SARS-COV-2    Collection Time: 12/01/20 12:23 AM   Result Value Ref Range    Specimen source Nasopharyngeal      COVID-19 rapid test Not detected NOTD      Specimen type NP Swab      Health status Nasopharyngeal         images and reports reviewed    Assessment:   Vinny Elliott is a 41 y.o. female who is presenting with a very challenging clinical scenario. The patient is morbidly obese and already had an open umbilical hernia 1 year ago at an outside institution for which the patient does not want a follow-up with them despite my recommendation. The patient has recurrence of her hernia seen now on 2 CT scan and she has been having significant abdominal pain at the site of the hernia. She she was in our emergency room 1 week ago where a CT scan of abdomen and pelvis showed the hernia but unfortunately now her hernia 1 week later showed fat stranding and significant abdominal pain. This is concerning for incarcerated recurrent incisional hernia. However the concerning part for me is that there is already now fat stranding so placing a mesh but at risk of infection. Not only that but also the patient last week she was diagnosed with UTI which puts her at even higher risk of mesh infection if we place a mesh currently.    I explained to the patient that ideally I would like to send her home and let the inflammation subside and then bring her back later for elective robotic hernia repair if possible however the patient has significant abdominal pain at the start of the incarceration and we will have to at least reduce the omentum. But again I will be able to place a mesh and I explained to her that the surgery will be only to relieve the incarcerated omentum and not fix the hernia because the recurrence rate is extremely high by just primary closing it. I also explained to her that the old mesh may be stuck to the omentum and it may need an open surgery for repair. The patient has stated she is in severe pain and she would like to have anything done to relieve the pain which I do understand. I think that the best option for her is to proceed with the robotic exploration and attempt to reduce the omentum and the incarcerated contents from the hernia sac and see if I can close the defect primarily but I will not place a mesh because of the stranding of the fat and edema as well as her recent UTI from last week. I explained to her that again there is a very high risk of recurrence during this but this is the best option currently with her situation, and then we will schedule her for another surgery later on for elective repair with mesh placement. I explained to the the patient about the robotic hernia repair procedure. Plan:     Regular diet for today.   N.p.o. after midnight for surgery tomorrow  IV fluids  IV antibiotics because of the stranding of the fat despite no evidence of infection  Pain management  We will schedule her for robotic exploration and possible primary repair of recurrent incisional hernia  Discharge planning for tomorrow after the surgery    Please call me if you have any questions (cell phone: [phone])     Signed By: Juana Shabazz MD     December 1, 2020

## 2020-12-01 NOTE — ED TRIAGE NOTES
Pt states she was seen the other day for a hernia and has been having worsening abdominal pain. Pt also report nausea and states she vomited once today.

## 2020-12-02 ENCOUNTER — ANESTHESIA (OUTPATIENT)
Dept: SURGERY | Age: 45
DRG: 355 | End: 2020-12-02
Payer: MEDICARE

## 2020-12-02 ENCOUNTER — ANESTHESIA EVENT (OUTPATIENT)
Dept: SURGERY | Age: 45
DRG: 355 | End: 2020-12-02
Payer: MEDICARE

## 2020-12-02 VITALS
BODY MASS INDEX: 35.55 KG/M2 | RESPIRATION RATE: 18 BRPM | WEIGHT: 240 LBS | SYSTOLIC BLOOD PRESSURE: 102 MMHG | TEMPERATURE: 97.5 F | OXYGEN SATURATION: 99 % | DIASTOLIC BLOOD PRESSURE: 70 MMHG | HEART RATE: 72 BPM | HEIGHT: 69 IN

## 2020-12-02 PROBLEM — K43.9 VENTRAL HERNIA: Status: ACTIVE | Noted: 2020-12-02

## 2020-12-02 LAB
BACTERIA SPEC CULT: NORMAL
HCG UR QL: NEGATIVE
SERVICE CMNT-IMP: NORMAL

## 2020-12-02 PROCEDURE — 76060000032 HC ANESTHESIA 0.5 TO 1 HR: Performed by: SURGERY

## 2020-12-02 PROCEDURE — 0WUF0JZ SUPPLEMENT ABDOMINAL WALL WITH SYNTHETIC SUBSTITUTE, OPEN APPROACH: ICD-10-PCS | Performed by: SURGERY

## 2020-12-02 PROCEDURE — 99218 HC RM OBSERVATION: CPT

## 2020-12-02 PROCEDURE — 49657 PR LAP, RECURRENT INCISIONAL HERNIA REPAIR,INCARCERATED: CPT | Performed by: SURGERY

## 2020-12-02 PROCEDURE — 77030020703 HC SEAL CANN DISP INTU -B: Performed by: SURGERY

## 2020-12-02 PROCEDURE — 81025 URINE PREGNANCY TEST: CPT

## 2020-12-02 PROCEDURE — 74011000250 HC RX REV CODE- 250: Performed by: NURSE ANESTHETIST, CERTIFIED REGISTERED

## 2020-12-02 PROCEDURE — 76210000016 HC OR PH I REC 1 TO 1.5 HR: Performed by: SURGERY

## 2020-12-02 PROCEDURE — 74011250636 HC RX REV CODE- 250/636: Performed by: NURSE ANESTHETIST, CERTIFIED REGISTERED

## 2020-12-02 PROCEDURE — 2709999900 HC NON-CHARGEABLE SUPPLY: Performed by: SURGERY

## 2020-12-02 PROCEDURE — 65270000029 HC RM PRIVATE

## 2020-12-02 PROCEDURE — 8E0W0CZ ROBOTIC ASSISTED PROCEDURE OF TRUNK REGION, OPEN APPROACH: ICD-10-PCS | Performed by: SURGERY

## 2020-12-02 PROCEDURE — 74011000258 HC RX REV CODE- 258: Performed by: NURSE ANESTHETIST, CERTIFIED REGISTERED

## 2020-12-02 PROCEDURE — 77030010507 HC ADH SKN DERMBND J&J -B: Performed by: SURGERY

## 2020-12-02 PROCEDURE — 77030031139 HC SUT VCRL2 J&J -A: Performed by: SURGERY

## 2020-12-02 PROCEDURE — 76010000933 HC OR TIME 0.5 TO 1HR INTENSV - TIER 2: Performed by: SURGERY

## 2020-12-02 PROCEDURE — 77030032490 HC SLV COMPR SCD KNE COVD -B: Performed by: SURGERY

## 2020-12-02 PROCEDURE — 74011250636 HC RX REV CODE- 250/636: Performed by: SURGERY

## 2020-12-02 PROCEDURE — 77030002933 HC SUT MCRYL J&J -A: Performed by: SURGERY

## 2020-12-02 PROCEDURE — 74011000250 HC RX REV CODE- 250: Performed by: SURGERY

## 2020-12-02 PROCEDURE — C1781 MESH (IMPLANTABLE): HCPCS | Performed by: SURGERY

## 2020-12-02 PROCEDURE — 77030035277 HC OBTRTR BLDELSS DISP INTU -B: Performed by: SURGERY

## 2020-12-02 PROCEDURE — S2900 ROBOTIC SURGICAL SYSTEM: HCPCS | Performed by: SURGERY

## 2020-12-02 PROCEDURE — 2709999900 HC NON-CHARGEABLE SUPPLY

## 2020-12-02 PROCEDURE — 74011250637 HC RX REV CODE- 250/637: Performed by: SURGERY

## 2020-12-02 PROCEDURE — 00790 ANES IPER UPR ABD NOS: CPT | Performed by: ANESTHESIOLOGY

## 2020-12-02 PROCEDURE — 74011000258 HC RX REV CODE- 258: Performed by: SURGERY

## 2020-12-02 PROCEDURE — C9113 INJ PANTOPRAZOLE SODIUM, VIA: HCPCS | Performed by: SURGERY

## 2020-12-02 DEVICE — VENTRALIGHT ST MESH, 4" X 6" (10.2 CM X 15.2 CM), ELLIPSE
Type: IMPLANTABLE DEVICE | Site: ABDOMEN | Status: FUNCTIONAL
Brand: VENTRALIGHT

## 2020-12-02 RX ORDER — CEFAZOLIN SODIUM 1 G/3ML
INJECTION, POWDER, FOR SOLUTION INTRAMUSCULAR; INTRAVENOUS AS NEEDED
Status: DISCONTINUED | OUTPATIENT
Start: 2020-12-02 | End: 2020-12-02 | Stop reason: HOSPADM

## 2020-12-02 RX ORDER — SODIUM CHLORIDE 0.9 % (FLUSH) 0.9 %
5-40 SYRINGE (ML) INJECTION EVERY 8 HOURS
Status: DISCONTINUED | OUTPATIENT
Start: 2020-12-02 | End: 2020-12-02 | Stop reason: HOSPADM

## 2020-12-02 RX ORDER — PROPOFOL 10 MG/ML
INJECTION, EMULSION INTRAVENOUS AS NEEDED
Status: DISCONTINUED | OUTPATIENT
Start: 2020-12-02 | End: 2020-12-02 | Stop reason: HOSPADM

## 2020-12-02 RX ORDER — LIDOCAINE HYDROCHLORIDE 20 MG/ML
INJECTION, SOLUTION EPIDURAL; INFILTRATION; INTRACAUDAL; PERINEURAL AS NEEDED
Status: DISCONTINUED | OUTPATIENT
Start: 2020-12-02 | End: 2020-12-02 | Stop reason: HOSPADM

## 2020-12-02 RX ORDER — FENTANYL CITRATE 50 UG/ML
INJECTION, SOLUTION INTRAMUSCULAR; INTRAVENOUS AS NEEDED
Status: DISCONTINUED | OUTPATIENT
Start: 2020-12-02 | End: 2020-12-02 | Stop reason: HOSPADM

## 2020-12-02 RX ORDER — NEOSTIGMINE METHYLSULFATE 1 MG/ML
INJECTION INTRAVENOUS AS NEEDED
Status: DISCONTINUED | OUTPATIENT
Start: 2020-12-02 | End: 2020-12-02 | Stop reason: HOSPADM

## 2020-12-02 RX ORDER — SODIUM CHLORIDE, SODIUM LACTATE, POTASSIUM CHLORIDE, CALCIUM CHLORIDE 600; 310; 30; 20 MG/100ML; MG/100ML; MG/100ML; MG/100ML
INJECTION, SOLUTION INTRAVENOUS
Status: DISCONTINUED | OUTPATIENT
Start: 2020-12-02 | End: 2020-12-02 | Stop reason: HOSPADM

## 2020-12-02 RX ORDER — DEXAMETHASONE SODIUM PHOSPHATE 4 MG/ML
INJECTION, SOLUTION INTRA-ARTICULAR; INTRALESIONAL; INTRAMUSCULAR; INTRAVENOUS; SOFT TISSUE AS NEEDED
Status: DISCONTINUED | OUTPATIENT
Start: 2020-12-02 | End: 2020-12-02 | Stop reason: HOSPADM

## 2020-12-02 RX ORDER — ROCURONIUM BROMIDE 10 MG/ML
INJECTION, SOLUTION INTRAVENOUS AS NEEDED
Status: DISCONTINUED | OUTPATIENT
Start: 2020-12-02 | End: 2020-12-02 | Stop reason: HOSPADM

## 2020-12-02 RX ORDER — DEXTROSE 50 % IN WATER (D50W) INTRAVENOUS SYRINGE
25-50 AS NEEDED
Status: DISCONTINUED | OUTPATIENT
Start: 2020-12-02 | End: 2020-12-02 | Stop reason: HOSPADM

## 2020-12-02 RX ORDER — ONDANSETRON 2 MG/ML
INJECTION INTRAMUSCULAR; INTRAVENOUS AS NEEDED
Status: DISCONTINUED | OUTPATIENT
Start: 2020-12-02 | End: 2020-12-02 | Stop reason: HOSPADM

## 2020-12-02 RX ORDER — HYDROMORPHONE HYDROCHLORIDE 1 MG/ML
0.5 INJECTION, SOLUTION INTRAMUSCULAR; INTRAVENOUS; SUBCUTANEOUS
Status: DISCONTINUED | OUTPATIENT
Start: 2020-12-02 | End: 2020-12-02 | Stop reason: HOSPADM

## 2020-12-02 RX ORDER — GLYCOPYRROLATE 0.2 MG/ML
INJECTION INTRAMUSCULAR; INTRAVENOUS AS NEEDED
Status: DISCONTINUED | OUTPATIENT
Start: 2020-12-02 | End: 2020-12-02 | Stop reason: HOSPADM

## 2020-12-02 RX ORDER — DEXMEDETOMIDINE HYDROCHLORIDE 100 UG/ML
INJECTION, SOLUTION INTRAVENOUS AS NEEDED
Status: DISCONTINUED | OUTPATIENT
Start: 2020-12-02 | End: 2020-12-02 | Stop reason: HOSPADM

## 2020-12-02 RX ORDER — HYDROMORPHONE HYDROCHLORIDE 1 MG/ML
1 INJECTION, SOLUTION INTRAMUSCULAR; INTRAVENOUS; SUBCUTANEOUS
Status: DISCONTINUED | OUTPATIENT
Start: 2020-12-02 | End: 2020-12-02 | Stop reason: HOSPADM

## 2020-12-02 RX ORDER — OXYCODONE AND ACETAMINOPHEN 5; 325 MG/1; MG/1
1 TABLET ORAL
Status: DISCONTINUED | OUTPATIENT
Start: 2020-12-02 | End: 2020-12-02 | Stop reason: HOSPADM

## 2020-12-02 RX ORDER — MIDAZOLAM HYDROCHLORIDE 1 MG/ML
INJECTION, SOLUTION INTRAMUSCULAR; INTRAVENOUS AS NEEDED
Status: DISCONTINUED | OUTPATIENT
Start: 2020-12-02 | End: 2020-12-02 | Stop reason: HOSPADM

## 2020-12-02 RX ORDER — SODIUM CHLORIDE 0.9 % (FLUSH) 0.9 %
5-40 SYRINGE (ML) INJECTION AS NEEDED
Status: DISCONTINUED | OUTPATIENT
Start: 2020-12-02 | End: 2020-12-02 | Stop reason: HOSPADM

## 2020-12-02 RX ORDER — ONDANSETRON 2 MG/ML
4 INJECTION INTRAMUSCULAR; INTRAVENOUS ONCE
Status: DISCONTINUED | OUTPATIENT
Start: 2020-12-02 | End: 2020-12-02 | Stop reason: HOSPADM

## 2020-12-02 RX ORDER — INSULIN LISPRO 100 [IU]/ML
INJECTION, SOLUTION INTRAVENOUS; SUBCUTANEOUS EVERY 4 HOURS
Status: DISCONTINUED | OUTPATIENT
Start: 2020-12-02 | End: 2020-12-02 | Stop reason: HOSPADM

## 2020-12-02 RX ORDER — NALOXONE HYDROCHLORIDE 0.4 MG/ML
0.4 INJECTION, SOLUTION INTRAMUSCULAR; INTRAVENOUS; SUBCUTANEOUS AS NEEDED
Status: DISCONTINUED | OUTPATIENT
Start: 2020-12-02 | End: 2020-12-02 | Stop reason: HOSPADM

## 2020-12-02 RX ORDER — OXYCODONE AND ACETAMINOPHEN 5; 325 MG/1; MG/1
1 TABLET ORAL
Qty: 24 TAB | Refills: 0 | Status: SHIPPED | OUTPATIENT
Start: 2020-12-02 | End: 2020-12-05

## 2020-12-02 RX ORDER — MAGNESIUM SULFATE 100 %
4 CRYSTALS MISCELLANEOUS AS NEEDED
Status: DISCONTINUED | OUTPATIENT
Start: 2020-12-02 | End: 2020-12-02 | Stop reason: HOSPADM

## 2020-12-02 RX ORDER — FENTANYL CITRATE 50 UG/ML
50 INJECTION, SOLUTION INTRAMUSCULAR; INTRAVENOUS AS NEEDED
Status: DISCONTINUED | OUTPATIENT
Start: 2020-12-02 | End: 2020-12-02 | Stop reason: HOSPADM

## 2020-12-02 RX ORDER — SUCCINYLCHOLINE CHLORIDE 20 MG/ML
INJECTION INTRAMUSCULAR; INTRAVENOUS AS NEEDED
Status: DISCONTINUED | OUTPATIENT
Start: 2020-12-02 | End: 2020-12-02 | Stop reason: HOSPADM

## 2020-12-02 RX ADMIN — FENTANYL CITRATE 50 MCG: 50 INJECTION, SOLUTION INTRAMUSCULAR; INTRAVENOUS at 15:48

## 2020-12-02 RX ADMIN — NEOSTIGMINE METHYLSULFATE 3 MG: 1 INJECTION INTRAVENOUS at 14:44

## 2020-12-02 RX ADMIN — DEXMEDETOMIDINE 10 MCG: 100 INJECTION, SOLUTION, CONCENTRATE INTRAVENOUS at 14:13

## 2020-12-02 RX ADMIN — PHENYLEPHRINE HYDROCHLORIDE 200 MCG: 10 INJECTION INTRAVENOUS at 14:31

## 2020-12-02 RX ADMIN — KETOROLAC TROMETHAMINE 15 MG: 15 INJECTION, SOLUTION INTRAMUSCULAR; INTRAVENOUS at 17:23

## 2020-12-02 RX ADMIN — ROCURONIUM BROMIDE 10 MG: 10 SOLUTION INTRAVENOUS at 14:00

## 2020-12-02 RX ADMIN — OXYCODONE HYDROCHLORIDE AND ACETAMINOPHEN 1 TABLET: 5; 325 TABLET ORAL at 17:23

## 2020-12-02 RX ADMIN — DEXMEDETOMIDINE 10 MCG: 100 INJECTION, SOLUTION, CONCENTRATE INTRAVENOUS at 14:19

## 2020-12-02 RX ADMIN — FENTANYL CITRATE 50 MCG: 50 INJECTION, SOLUTION INTRAMUSCULAR; INTRAVENOUS at 15:27

## 2020-12-02 RX ADMIN — SUGAMMADEX 200 MG: 100 INJECTION, SOLUTION INTRAVENOUS at 14:36

## 2020-12-02 RX ADMIN — PIPERACILLIN AND TAZOBACTAM 3.38 G: 3; .375 INJECTION, POWDER, LYOPHILIZED, FOR SOLUTION INTRAVENOUS at 05:00

## 2020-12-02 RX ADMIN — CEFAZOLIN 2 G: 1 INJECTION, POWDER, FOR SOLUTION INTRAVENOUS at 14:00

## 2020-12-02 RX ADMIN — SODIUM CHLORIDE, SODIUM LACTATE, POTASSIUM CHLORIDE, AND CALCIUM CHLORIDE: 600; 310; 30; 20 INJECTION, SOLUTION INTRAVENOUS at 13:05

## 2020-12-02 RX ADMIN — ROCURONIUM BROMIDE 30 MG: 10 SOLUTION INTRAVENOUS at 14:10

## 2020-12-02 RX ADMIN — ONDANSETRON 4 MG: 2 INJECTION INTRAMUSCULAR; INTRAVENOUS at 05:45

## 2020-12-02 RX ADMIN — FENTANYL CITRATE 50 MCG: 50 INJECTION, SOLUTION INTRAMUSCULAR; INTRAVENOUS at 14:13

## 2020-12-02 RX ADMIN — LIDOCAINE HYDROCHLORIDE 60 MG: 20 INJECTION, SOLUTION INTRAVENOUS at 14:01

## 2020-12-02 RX ADMIN — DEXAMETHASONE SODIUM PHOSPHATE 8 MG: 4 INJECTION, SOLUTION INTRA-ARTICULAR; INTRALESIONAL; INTRAMUSCULAR; INTRAVENOUS; SOFT TISSUE at 14:05

## 2020-12-02 RX ADMIN — DEXAMETHASONE SODIUM PHOSPHATE: 10 INJECTION, SOLUTION INTRAMUSCULAR; INTRAVENOUS at 14:14

## 2020-12-02 RX ADMIN — DEXMEDETOMIDINE 10 MCG: 100 INJECTION, SOLUTION, CONCENTRATE INTRAVENOUS at 14:16

## 2020-12-02 RX ADMIN — FENTANYL CITRATE 100 MCG: 50 INJECTION, SOLUTION INTRAMUSCULAR; INTRAVENOUS at 14:00

## 2020-12-02 RX ADMIN — GLYCOPYRROLATE 0.6 MG: 0.2 INJECTION INTRAMUSCULAR; INTRAVENOUS at 14:44

## 2020-12-02 RX ADMIN — ONDANSETRON 4 MG: 2 SOLUTION INTRAMUSCULAR; INTRAVENOUS at 14:33

## 2020-12-02 RX ADMIN — SUCCINYLCHOLINE CHLORIDE 100 MG: 20 INJECTION, SOLUTION INTRAMUSCULAR; INTRAVENOUS at 14:01

## 2020-12-02 RX ADMIN — HYDROMORPHONE HYDROCHLORIDE 0.5 MG: 1 INJECTION, SOLUTION INTRAMUSCULAR; INTRAVENOUS; SUBCUTANEOUS at 05:00

## 2020-12-02 RX ADMIN — SODIUM CHLORIDE 40 MG: 9 INJECTION, SOLUTION INTRAMUSCULAR; INTRAVENOUS; SUBCUTANEOUS at 09:01

## 2020-12-02 RX ADMIN — FENTANYL CITRATE 50 MCG: 50 INJECTION, SOLUTION INTRAMUSCULAR; INTRAVENOUS at 14:06

## 2020-12-02 RX ADMIN — MIDAZOLAM 2 MG: 1 INJECTION INTRAMUSCULAR; INTRAVENOUS at 13:52

## 2020-12-02 RX ADMIN — PROPOFOL 200 MG: 10 INJECTION, EMULSION INTRAVENOUS at 14:01

## 2020-12-02 RX ADMIN — DEXMEDETOMIDINE 10 MCG: 100 INJECTION, SOLUTION, CONCENTRATE INTRAVENOUS at 14:24

## 2020-12-02 RX ADMIN — DEXMEDETOMIDINE 10 MCG: 100 INJECTION, SOLUTION, CONCENTRATE INTRAVENOUS at 14:18

## 2020-12-02 NOTE — PROGRESS NOTES
Date of Surgery Update:  Noa Bronson was seen and examined. History and physical has been reviewed. Significant clinical changes have occurred as noted:  Patient is feeling much better, so I explained to her that if there are not significant inflammation, I may place a mesh since she is feeling better. Always there is a risk of mesh infection but I believe that way she does not have to come back for another surgery. Will make the decision intra-operatively. I added to the consent possible mesh and patient signed.      Signed By: Chip Rayo MD     December 2, 2020 1:42 PM

## 2020-12-02 NOTE — PROGRESS NOTES
Received pt alert and verbal and oriented x 4. She c/o abdominal pain that she says comes and goes but is not at this time relieved by the previous dose of toradol given earlier. . Will medicate per MAR. 2015 resting quietly. States pain relieved. Will continue to monitor. 0720 Bedside and Verbal shift change report given to Valentina Caballero RN (oncoming nurse) by Mckenna Hernandez RN (offgoing nurse). Report included the following information SBAR and Kardex.

## 2020-12-02 NOTE — ROUTINE PROCESS
Bedside shift report received from [place] with sbar Dr. Moira Feliz in to see patient Npo for surgery To surgery via bed Returned from surgery, o2 at [facility] sites cdi  at bedside Percocet given for pain 
toradol given for pain Pain improved 
oob to bathroom voided 275 Discharge instructions given Discharged via wc

## 2020-12-02 NOTE — ANESTHESIA PREPROCEDURE EVALUATION
Relevant Problems   No relevant active problems       Anesthetic History   No history of anesthetic complications            Review of Systems / Medical History  Patient summary reviewed and pertinent labs reviewed    Pulmonary          Smoker         Neuro/Psych   Within defined limits           Cardiovascular                       GI/Hepatic/Renal                Endo/Other        Obesity     Other Findings              Physical Exam    Airway  Mallampati: II  TM Distance: 4 - 6 cm  Neck ROM: normal range of motion   Mouth opening: Normal     Cardiovascular    Rhythm: regular  Rate: normal         Dental    Dentition: Poor dentition     Pulmonary  Breath sounds clear to auscultation               Abdominal  GI exam deferred       Other Findings            Anesthetic Plan    ASA: 2  Anesthesia type: general          Induction: Intravenous  Anesthetic plan and risks discussed with: Patient

## 2020-12-02 NOTE — ROUTINE PROCESS
Bedside and Verbal shift change report given to Celine Zaldivar (oncoming nurse) by Toro Fontanez RN, BSN (offgoing nurse). Report given with SBAR, Kardex, Intake/Output, MAR and Recent Results.

## 2020-12-02 NOTE — BRIEF OP NOTE
Brief Postoperative Note    Patient: Roann Osler  YOB: 1975  MRN: [id number]    Date of Procedure: 12/2/2020     Pre-Op Diagnosis: Incarcerated recurrent incisional hernia. Post-Op Diagnosis: Same as preoperative diagnosis. Procedure(s):  Robotic Repair of Incarcerated Incisional Hernia with mesh    Surgeon(s):  Charles Salmon MD    Surgical Assistant: Surg Asst-1: Mariya Moser    Anesthesia: General     Estimated Blood Loss (mL): Minimal    Complications: None    Specimens: * No specimens in log *     Implants:   Implant Name Type Inv.  Item Serial No.  Lot No. LRB No. Used Action   MESH Pagan Andres S/T - [id number]  MESH SAMARIA ELLIPTICAL 4X6IN -- VENTRALIGHT S/T  BARD DAVOL_WD [id number] N/A 1 Implanted       Drains: * No LDAs found *    Findings: Incarcerated recurrent incisional hernia    Electronically Signed by Nathalie Howard MD on 12/2/2020 at 2:43 PM

## 2020-12-02 NOTE — OP NOTES
[facility]  OPERATIVE REPORT    Name:  Dina Macias  MR#:   [id number]  :  1975  ACCOUNT #:  [de-identified]  DATE OF SERVICE:  2020    PREOPERATIVE DIAGNOSIS:  Incarcerated recurrent incisional hernia. POSTOPERATIVE DIAGNOSIS:  Incarcerated recurrent incisional hernia. PROCEDURE PERFORMED:  Robotic repair of incarcerated recurrent incisional hernia with placement of mesh. SURGEON:  Ann Lomas. MD Francisco Olson. ANESTHESIA:  General.    COMPLICATIONS:  None. SPECIMENS REMOVED:  None. IMPLANTS:  Ventralight Bard mesh 4 x 6 inches. ESTIMATED BLOOD LOSS:  Minimal.    PROCEDURE:  The patient was brought to operating room. Anesthesia was induced. Scrubbing and draping of the abdomen was done in usual manner. A time-out was performed. A skin incision in left upper quadrant was performed. Veress needle was inserted. Saline drop test was performed. Abdomen was insufflated. An 8 mm port was inserted. Abdomen was explored. There was an incarcerated omentum inside a recurrent hernia in the supraumbilical/umbilical area. At this point, TAP block was performed on both side of the abdominal wall under direct visualization. Two other 8 mm ports were placed under direct vision on the left side of the abdominal wall and the robot was docked. Lysis of adhesion was performed by taking the omentum from inside the hernia. After further dissection and luckily for us the hernia was reduced relatively easily and I was very happy that there was no omental necrosis and no fluids in the hernia sac to suggest any significant severe inflammation or infection. I was able to reduce the omentum completely and I reduced slightly the hernia sac to identify the defect.   There were two defects; one where there were some suture probably from the previous hernia repair and one below it; one at the umbilicus and one about 1 cm above it but they were connected with a small band between them. At this point, the total defect size was about 3 cm, so we decided to open a 4 x 6 inches Ventralight Bard mesh. 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 So, I decided to place a mesh during the same surgery. At this point, the mesh was placed inside the abdomen with the rough side towards the abdominal wall and the smooth side towards the bowel and it was fixed with a 2-0 V-Loc sutures 12 inches three of them in a running fashion. Hemostasis was secured. All the instruments were removed. Exploration of the abdomen revealed no bleeding and no bowel injury. At this point, abdomen was desufflated and the skin incisions were closed with 4-0 Monocryl and glue.       Kedar Altamirano MD      [id number]  D:  12/02/2020 14:58  T:  12/02/2020 16:25  JOB #:  [id number]

## 2020-12-02 NOTE — ANESTHESIA POSTPROCEDURE EVALUATION
Procedure(s):  Robotic Repair of Incarcerated Incisional Hernia no mesh.     general    Anesthesia Post Evaluation      Multimodal analgesia: multimodal analgesia used between 6 hours prior to anesthesia start to PACU discharge  Patient location during evaluation: bedside  Patient participation: complete - patient participated  Level of consciousness: awake  Pain management: adequate  Airway patency: patent  Anesthetic complications: no  Cardiovascular status: stable  Respiratory status: acceptable  Hydration status: acceptable  Post anesthesia nausea and vomiting:  controlled      INITIAL Post-op Vital signs:   Vitals Value Taken Time   BP 96/59 12/2/2020  4:02 PM   Temp 36.4 °C (97.5 °F) 12/2/2020  4:02 PM   Pulse 62 12/2/2020  4:07 PM   Resp 15 12/2/2020  4:07 PM   SpO2 89 % 12/2/2020  4:07 PM

## 2020-12-02 NOTE — PROGRESS NOTES
Problem: Falls - Risk of  Goal: *Absence of Falls  Description: Document Jayy Arsh Fall Risk and appropriate interventions in the flowsheet.   Outcome: Progressing Towards Goal  Note: Fall Risk Interventions:            Medication Interventions: Patient to call before getting OOB                   Problem: Patient Education: Go to Patient Education Activity  Goal: Patient/Family Education  Outcome: Progressing Towards Goal     Problem: Pain  Goal: *Control of Pain  Outcome: Progressing Towards Goal

## 2020-12-02 NOTE — PROGRESS NOTES
TRANSFER - OUT REPORT:    Verbal report given to [facility]) on Shalonda Bourne  being transferred to  (unit) for routine progression of care       Report consisted of patients Situation, Background, Assessment and   Recommendations(SBAR). Information from the following report(s) SBAR, Kardex, OR Summary, Procedure Summary, Intake/Output, MAR, Med Rec Status and Cardiac Rhythm NSr was reviewed with the receiving nurse. Lines:   Peripheral IV 12/01/20 Left Wrist (Active)   Site Assessment Clean, dry, & intact 12/02/20 1454   Phlebitis Assessment 0 12/02/20 1454   Infiltration Assessment 0 12/02/20 1454   Dressing Status Clean, dry, & intact 12/02/20 1454   Dressing Type Transparent;Tape 12/02/20 1454   Hub Color/Line Status Blue;Patent 12/02/20 1454   Action Taken Open ports on tubing capped 12/02/20 1055   Alcohol Cap Used Yes 12/02/20 1055       Peripheral IV 12/01/20 Left; Inner Arm (Active)   Site Assessment Clean, dry, & intact 12/02/20 1454   Phlebitis Assessment 0 12/02/20 1454   Infiltration Assessment 0 12/02/20 1454   Dressing Status Clean, dry, & intact 12/02/20 1454   Dressing Type Transparent;Tape 12/02/20 1454   Hub Color/Line Status Blue;Patent 12/02/20 1454   Action Taken Open ports on tubing capped 12/02/20 1055   Alcohol Cap Used Yes 12/02/20 1055       Peripheral IV 12/02/20 Posterior;Right Hand (Active)   Site Assessment Clean, dry, & intact 12/02/20 1454   Phlebitis Assessment 0 12/02/20 1454   Infiltration Assessment 0 12/02/20 1454   Dressing Status Clean, dry, & intact 12/02/20 1454   Dressing Type Transparent;Tape 12/02/20 1454   Hub Color/Line Status Patent;Pink 12/02/20 1454        Opportunity for questions and clarification was provided.       Patient transported with:   O2 @ 4 liters  Registered Nurse

## 2020-12-02 NOTE — PROGRESS NOTES
Patient seen and examined. She is doing much better. I told her that I may place a mesh but will decide intra-operative. Will proceed with robotic exploration, reduction of hernia content and possible hernia repair with mesh.

## 2020-12-02 NOTE — PROGRESS NOTES
1454 received PT from     [phone] off [address] into 80[address] started NC at 4L    1600 TRANSFER - OUT REPORT:    Verbal report given to [facility]) on Oliver Jordan  being transferred to  (unit) for routine progression of care       Report consisted of patients Situation, Background, Assessment and   Recommendations(SBAR). Information from the following report(s) SBAR, Kardex, Procedure Summary, Intake/Output, Recent Results, Med Rec Status and Cardiac Rhythm NSr was reviewed with the receiving nurse. Lines:   Peripheral IV 12/01/20 Left Wrist (Active)   Site Assessment Clean, dry, & intact 12/02/20 1454   Phlebitis Assessment 0 12/02/20 1454   Infiltration Assessment 0 12/02/20 1454   Dressing Status Clean, dry, & intact 12/02/20 1454   Dressing Type Transparent;Tape 12/02/20 1454   Hub Color/Line Status Blue;Patent 12/02/20 1454   Action Taken Open ports on tubing capped 12/02/20 1055   Alcohol Cap Used Yes 12/02/20 1055       Peripheral IV 12/01/20 Left; Inner Arm (Active)   Site Assessment Clean, dry, & intact 12/02/20 1454   Phlebitis Assessment 0 12/02/20 1454   Infiltration Assessment 0 12/02/20 1454   Dressing Status Clean, dry, & intact 12/02/20 1454   Dressing Type Transparent;Tape 12/02/20 1454   Hub Color/Line Status Blue;Patent 12/02/20 1454   Action Taken Open ports on tubing capped 12/02/20 1055   Alcohol Cap Used Yes 12/02/20 1055       Peripheral IV 12/02/20 Posterior;Right Hand (Active)   Site Assessment Clean, dry, & intact 12/02/20 1454   Phlebitis Assessment 0 12/02/20 1454   Infiltration Assessment 0 12/02/20 1454   Dressing Status Clean, dry, & intact 12/02/20 1454   Dressing Type Transparent;Tape 12/02/20 1454   Hub Color/Line Status Patent;Pink 12/02/20 1454        Opportunity for questions and clarification was provided.       Patient transported with:   Registered Nurse

## 2020-12-02 NOTE — DISCHARGE INSTRUCTIONS
Patient Education        Reparación de hernia abdominal: Alejandro Pastor en el hogar  Abdominal Hernia Repair: What to Expect at Home  Childress recuperación  [facility] para reparar childress hernia, es probable que sienta dolor adeline algunos días. También podría sentirse daron si [place] gripe, y podría tener un poco de [place], estar fatigado y [place]. [place] es común. Debería sentirse mejor después de unos días y es probable que se sienta mucho mejor en 7 días. Adeline varias semanas, podría sentir punzadas o tirones en la reparación de la hernia al [employer]. Es posible que tenga algunos moretones alrededor de la khadijah de la reparación de la hernia. [place] es normal.  Esta hoja de [employer] idea general del tiempo que le llevará recuperarse. Sin embargo, cada persona se recupera a un ritmo diferente. Siga los pasos que se mencionan a continuación para recuperarse lo más rápido posible. ¿Cómo puede cuidarse en el [facility]? Actividad    · Descanse cuando se sienta fatigado. Dormir lo suficiente le ayudará a recuperarse.     · Intente caminar todos los días. Comience caminando un poco más de lo que caminó el día anterior. Poco a poco, aumente la distancia. Caminar mejora el flujo sanguíneo y [place] a prevenir la neumonía y el estreñimiento.     · Si childress médico le da irina faja abdominal para que utilice, hágalo según las indicaciones. Esta consiste en un vendaje elástico que se enrolla alrededor del abdomen y de la parte superior de la cadera. Ayuda a sostener los músculos del abdomen después de la cirugía.     · Evite actividades extenuantes, daron montar en bicicleta, trotar, levantar pesas o hacer ejercicios aeróbicos, hasta que childress médico lo autorice.     · Evite levantar objetos que pudieran implicar un esfuerzo.  [place] podría incluir bolsas de compras pesadas y recipientes para Emre Eileen pesados, bolsas de arena para excrementos de neel o alimentos para angie, irina aspiradora o un nicolasa.     · Pregúntele a adams médico cuándo podrá volver a conducir.     · La mayoría de las personas pueden volver a trabajar 1 o 2 semanas después de la cirugía. Milad si adams trabajo implica levantar objetos pesados o hacer actividades extenuantes, es posible que necesite ausentarse del ANNECY 4 y 6 semanas.     · Puede ducharse entre 24 y 50 horas después de la [place], si adams médico lo Bobo Sergio. Seque el saad (incisión) con toques suaves de toalla. No tome un baño de inmersión en irina garcía adeline las primeras 2 semanas o hasta que adams médico lo apruebe.     · Pregúntele a adams médico cuándo [address]. Alimentación    · Puede continuar con adams alimentación normal. Si tiene malestar estomacal, coma alimentos suaves bajos en grasa, daron arroz sin condimentar, jerome a la [place], pan marija y yogur.     · Francine abundantes líquidos (a menos que adams médico le indique lo contrario).     · Dani después de la cirugía, puede notar que bobbi evacuaciones intestinales no son regulares. [place] es común. Trate de evitar el estreñimiento y de no hacer esfuerzos cuando evacua el intestino. Quizás le [employer] anna un suplemento de Laurent Felix. Si no william evacuado el intestino después de un par de días, pregúntele a adams médico si puede anna un laxante suave. Medicamentos    · Adams médico le dirá si puede volver a anna bobbi medicamentos y cuándo puede volver a hacerlo. También le dará indicaciones sobre cualquier medicamento nuevo que deba anna usted.     · Si richelle aspirina o cualquier otro medicamento que previene los coágulos de [place], pregúntele a adams médico si debería volver a tomarlo y en qué momento. Asegúrese de entender exactamente lo que adams médico quiere que maría.     · Sea shanel con los medicamentos. [place] los analgésicos (medicamentos para el dolor) exactamente según las indicaciones. ? Si el médico le recetó un analgésico, tómelo según las indicaciones.   ? Si no está tomando un analgésico recetado, pregúntele a adams médico si puede anna hernán de [employer].     · Si adams médico le recetó antibióticos, tómelos según las indicaciones. No deje de tomarlos por el hecho de sentirse mejor. Debe anna todos los antibióticos hasta terminarlos.     · Si le parece que el analgésico le produce revoltura estomacal:  ? [place] el analgésico después de las comidas (a menos que adams médico le haya indicado lo contrario). ? [place] al médico un analgésico diferente. Cuidado de la incisión    · Si le maria puesto tiras de cinta adhesiva sobre el saad (incisión) que hizo el médico, déjeselas adeline irina semana o hasta que se caigan. O siga las indicaciones de adams médico para quitarse la cinta.     · Si le cerraron el saad con grapas, tendrá que visitar al médico en 1 o 2 semanas para que se las quiten.     · Lave la khadijah a diario con agua jabonosa tibia y séquela con toques suaves de toalla. No use agua oxigenada ni alcohol, ya que pueden retardar la cicatrización. Puede cubrir la khadijah con irina venda de gasa si supura o roza contra la ropa. Cámbiese la [employer] días. Otras instrucciones    · Sostenga irina almohada sobre la incisión al toser o respirar profundamente. [place] sostendrá adams estómago y reducirá el dolor.     · Lucio Browner de respiración en el hogar daron le indicó adams médico. [place] le ayudará a prevenir irina neumonía.     · Si le maria hecho irina cirugía laparoscópica, también puede sentir dolor en el hombro franklyn. Por lo general, el dolor dura alrededor de [facility]. La atención de seguimiento es irina parte clave de adams tratamiento y seguridad. Asegúrese de hacer y acudir a todas las citas, y llame a adams médico si está teniendo problemas. También es irina buena idea saber los resultados de bobbi exámenes y mantener irina lista de los medicamentos que richelle. ¿Cuándo debe pedir ayuda? Llame al 911 en cualquier momento que considere que necesita atención de [place].  Por ejemplo, llame si:    · Se desmayó (perdió el conocimiento).   · Siente dolor repentino en el pecho y falta de aire, o si tose nishant.     · Siente dolor intenso en el abdomen. Llame a adams médico ahora mismo o busque atención médica inmediata si:    · Siente el estómago revuelto y no puede retener líquidos en el estómago.     · Tiene señales de un coágulo de [place], daron:  ? Dolor en la pantorrilla, detrás de la rodilla, en el muslo o en la janet. ? Enrojecimiento e hinchazón en la pierna o la janet.     · Tiene signos de infección, tales daron:  ? Aumento del dolor, la hinchazón, el enrojecimiento o la temperatura. ? Vetas rojizas que salen de la incisión. ? Pus que supura de la incisión. ? Rochelle Sol.     · Tiene problemas para orinar o evacuar el intestino, en especial si tiene dolor leve o hinchazón en la parte inferior del abdomen.     · La venda sobre la herida está empapada en nishant de color farris vivo. Preste especial atención a los cambios en adams ami y asegúrese de comunicarse con adams médico si:    · La hinchazón empeora.     · La hinchazón no disminuye.     · Aún no evacua el intestino después de anna un laxante. ¿Dónde puede encontrar más información en inglés? [place] a http://www.gray.com/  Escriba B577 en la búsqueda para aprender más acerca de \"Reparación de hernia abdominal: Qué esperar en el hogar. \"  Revisado: 15 adelfo, 2020               Versión del contenido: 12.6  © 6067-0517 [employer]. Las instrucciones de cuidado fueron adaptadas bajo licencia por [employer] (which disclaims liability or warranty for this information). Si usted tiene [place] afección médica o sobre estas instrucciones, siempre pregunte a adams profesional de ami. [facility] niega toda garantía o responsabilidad por adams uso de esta información.        Discharge Instructions Following Surgery    Patient: [place] [employer] MRN: [id number]  S  DISCHARGE SUMMARY from Nurse    PATIENT INSTRUCTIONS:    After general anesthesia or intravenous sedation, for 24 hours or while taking prescription Narcotics:  · Limit your activities  · Do not drive and operate hazardous machinery  · Do not make important personal or business decisions  · Do  not drink alcoholic beverages  · If you have not urinated within 8 hours after discharge, please contact your surgeon on call. Report the following to your surgeon:  · Excessive pain, swelling, redness or odor of or around the surgical area  · Temperature over 100.5  · Nausea and vomiting lasting longer than 4 hours or if unable to take medications  · Any signs of decreased circulation or nerve impairment to extremity: change in color, persistent  numbness, tingling, coldness or increase pain  · Any questions    What to do at Home:  Recommended activity: Activity as tolerated, ***    If you experience any of the following symptoms ***, please follow up with ***. *  Please give a list of your current medications to your Primary Care Provider. *  Please update this list whenever your medications are discontinued, doses are      changed, or new medications (including over-the-counter products) are added. *  Please carry medication information at all times in case of emergency situations. These are general instructions for a healthy lifestyle:    No smoking/ No tobacco products/ Avoid exposure to second hand smoke  Surgeon General's Warning:  Quitting smoking now greatly reduces serious risk to your health.     Obesity, smoking, and sedentary lifestyle greatly increases your risk for illness    A healthy diet, regular physical exercise & weight monitoring are important for maintaining a healthy lifestyle    You may be retaining fluid if you have a history of heart failure or if you experience any of the following symptoms:  Weight gain of 3 pounds or more overnight or 5 pounds in a week, increased swelling in our hands or feet or shortness of breath while lying flat in bed. Please call your doctor as soon as you notice any of these symptoms; do not wait until your next office visit. The discharge information has been reviewed with the {PATIENT PARENT GUARDIAN:01498}. The {PATIENT PARENT GUARDIAN:48073} verbalized understanding. Discharge medications reviewed with the {Dishcarge meds reviewed CELD:65014} and appropriate educational materials and side effects teaching were provided. ___________________________________________________________________________________________________________________________________SN: [id number]    YOB: 1975  Age: 41 y.o. Sex: female      Activity  · As tolerated, walking encourage, stairs are okay. · Avoid strenuous activities - no lifting anything heavier than 15 pounds till seen in the clinic. · You may shower at home after 24 hours. Diet  · Regular diet. Pain  · Take pain medication as directed by your doctor. · Call your doctor if pain is NOT relieved by medication. Wound and Dressing Care  · There is glue on the wounds. No need for any dressing care. · Apply ice packs to the area of the surgery for the first 1 to 2 days  · Apply warm compresses after 2 days for pain relieve if needed    After Anesthesia  · For the first 24 hours: DO NOT Drive, Drink alcoholic beverages, or Make important decisions. · Be aware of dizziness following anesthesia and while taking pain medication. Call your doctor if  · Excessive bleeding that does not stop after holding mild pressure over the area. · Temperature of 101 degrees F or above. · Redness,excessive swelling or bruising, and/or green or yellow, smelly discharge from incision. · If nausea and vomiting continues. Appointment date/time Follow-Up Phone Calls    · Call the office at [phone] to make your follow-up appointment in 2 weeks after the surgery (if not already set up) . Dr. Nancy Palma cell phone number is [phone].  Please call me if you have any concerns or questions.

## 2020-12-04 ENCOUNTER — TELEPHONE (OUTPATIENT)
Dept: SURGERY | Age: 45
End: 2020-12-04

## 2020-12-04 NOTE — DISCHARGE SUMMARY
General Surgery Discharge Note    Admission Date: 11/30/2020    Discharge Date: 12/2/2020    Admission Diagnosis:  Incarcerated recurrent incisional hernia    Discharge Diagnosis:  Same    Procedures Performed:   Robotic repair of incarcerated incisional hernia with placement of mesh    Hospital Course:  Patient was admitted on 11/30/2020 for abdominal pain and a picture of incarcerated incisional hernia. The patient was admitted first to the floor and she was started on IV fluids and IV antibiotics. She was taken the second day for robotic exploration and she had uneventful surgery. Patient admitted to the floor postoperatively, monitored as per protocol. She was discharged home on postoperative day 0. At the time of discharge the patient is afebrile, vital signs stable,  tolerating a diet, voiding spontaneously, ambulatory with adequate pain control with oral medications and clear surgical sites without evidence of infection. Condition on Discharge:  Stable    Follow-up care : Follow-up in 2 weeks in the clinic ([address] [place] [address] instructions provided)    Disposition:  Home    Discharge Medications:      No current facility-administered medications for this encounter. Current Outpatient Medications   Medication Sig    oxyCODONE-acetaminophen (PERCOCET) 5-325 mg per tablet Take 1 Tab by mouth every four (4) hours as needed for Pain for up to 3 days. Max Daily Amount: 6 Tabs.  ondansetron (Zofran ODT) 4 mg disintegrating tablet Take 1-2 tablets every 6-8 hours as needed for nausea and vomiting. Local wound care with daily showers, keep wounds clean and dry    Activity: as desired, no lifting greater than 15lbs or situps for 30 days     .

## 2020-12-04 NOTE — TELEPHONE ENCOUNTER
Patient called the office today complaining of pain and constipation after hernia surgery on 11/30/20. Patient states \"she cannot take the medication that was prescribed because it makes her skin hot, red, and itchy to the point that she can't take it anymore\". I checked patients allergies in chart and suggested alternating between tylenol and motrin to help with the pain. Patient states that both of those medications make her her skin feel the same way. I also advised the patient to take a stool stool softner and drink plenty of fluids. Patient states she has a fever but she doesn't have a thermometer to take her temp. She denies swelling and redness at surgical site. I informed the patient that I would reach out to Dr. Hadley Morales and return her call once instructions were given.

## 2020-12-08 ENCOUNTER — APPOINTMENT (OUTPATIENT)
Dept: CT IMAGING | Age: 45
End: 2020-12-08
Attending: EMERGENCY MEDICINE
Payer: MEDICARE

## 2020-12-08 ENCOUNTER — HOSPITAL ENCOUNTER (EMERGENCY)
Age: 45
Discharge: HOME | End: 2020-12-08
Attending: EMERGENCY MEDICINE
Payer: MEDICARE

## 2020-12-08 ENCOUNTER — APPOINTMENT (OUTPATIENT)
Dept: GENERAL RADIOLOGY | Age: 45
End: 2020-12-08
Attending: PHYSICIAN ASSISTANT
Payer: MEDICARE

## 2020-12-08 VITALS
DIASTOLIC BLOOD PRESSURE: 64 MMHG | BODY MASS INDEX: 35.99 KG/M2 | WEIGHT: 243 LBS | OXYGEN SATURATION: 98 % | TEMPERATURE: 98 F | RESPIRATION RATE: 20 BRPM | HEART RATE: 84 BPM | SYSTOLIC BLOOD PRESSURE: 114 MMHG | HEIGHT: 69 IN

## 2020-12-08 DIAGNOSIS — G89.18 POSTOPERATIVE ABDOMINAL PAIN: Primary | ICD-10-CM

## 2020-12-08 DIAGNOSIS — R10.9 POSTOPERATIVE ABDOMINAL PAIN: Primary | ICD-10-CM

## 2020-12-08 LAB
ALBUMIN SERPL-MCNC: 3.4 G/DL (ref 3.4–5)
ALBUMIN/GLOB SERPL: 0.8 {RATIO} (ref 0.8–1.7)
ALP SERPL-CCNC: 128 U/L (ref 45–117)
ALT SERPL-CCNC: 38 U/L (ref 13–56)
ANION GAP SERPL CALC-SCNC: 7 MMOL/L (ref 3–18)
APPEARANCE UR: ABNORMAL
AST SERPL-CCNC: 16 U/L (ref 10–38)
BACTERIA URNS QL MICRO: ABNORMAL /HPF
BASOPHILS # BLD: 0.1 K/UL (ref 0–0.1)
BASOPHILS NFR BLD: 0 % (ref 0–2)
BILIRUB SERPL-MCNC: 0.1 MG/DL (ref 0.2–1)
BILIRUB UR QL: NEGATIVE
BUN SERPL-MCNC: 15 MG/DL (ref 7–18)
BUN/CREAT SERPL: 17 (ref 12–20)
CALCIUM SERPL-MCNC: 8.8 MG/DL (ref 8.5–10.1)
CHLORIDE SERPL-SCNC: 111 MMOL/L (ref 100–111)
CO2 SERPL-SCNC: 23 MMOL/L (ref 21–32)
COLOR UR: YELLOW
CREAT SERPL-MCNC: 0.88 MG/DL (ref 0.6–1.3)
DIFFERENTIAL METHOD BLD: ABNORMAL
EOSINOPHIL # BLD: 0.8 K/UL (ref 0–0.4)
EOSINOPHIL NFR BLD: 5 % (ref 0–5)
EPITH CASTS URNS QL MICRO: ABNORMAL /LPF (ref 0–5)
ERYTHROCYTE [DISTWIDTH] IN BLOOD BY AUTOMATED COUNT: 14.2 % (ref 11.6–14.5)
GLOBULIN SER CALC-MCNC: 4.4 G/DL (ref 2–4)
GLUCOSE SERPL-MCNC: 82 MG/DL (ref 74–99)
GLUCOSE UR STRIP.AUTO-MCNC: NEGATIVE MG/DL
HCG SERPL QL: NEGATIVE
HCT VFR BLD AUTO: 39.4 % (ref 35–45)
HGB BLD-MCNC: 12.4 G/DL (ref 12–16)
HGB UR QL STRIP: ABNORMAL
KETONES UR QL STRIP.AUTO: ABNORMAL MG/DL
LEUKOCYTE ESTERASE UR QL STRIP.AUTO: ABNORMAL
LIPASE SERPL-CCNC: 102 U/L (ref 73–393)
LYMPHOCYTES # BLD: 4 K/UL (ref 0.9–3.6)
LYMPHOCYTES NFR BLD: 28 % (ref 21–52)
MCH RBC QN AUTO: 27 PG (ref 24–34)
MCHC RBC AUTO-ENTMCNC: 31.5 G/DL (ref 31–37)
MCV RBC AUTO: 85.8 FL (ref 74–97)
MONOCYTES # BLD: 1.9 K/UL (ref 0.05–1.2)
MONOCYTES NFR BLD: 13 % (ref 3–10)
MUCOUS THREADS URNS QL MICRO: ABNORMAL /LPF
NEUTS SEG # BLD: 7.6 K/UL (ref 1.8–8)
NEUTS SEG NFR BLD: 54 % (ref 40–73)
NITRITE UR QL STRIP.AUTO: NEGATIVE
PH UR STRIP: 5 [PH] (ref 5–8)
PLATELET # BLD AUTO: 363 K/UL (ref 135–420)
PMV BLD AUTO: 10.8 FL (ref 9.2–11.8)
POTASSIUM SERPL-SCNC: 4.1 MMOL/L (ref 3.5–5.5)
PROT SERPL-MCNC: 7.8 G/DL (ref 6.4–8.2)
PROT UR STRIP-MCNC: ABNORMAL MG/DL
RBC # BLD AUTO: 4.59 M/UL (ref 4.2–5.3)
RBC #/AREA URNS HPF: ABNORMAL /HPF (ref 0–5)
SODIUM SERPL-SCNC: 141 MMOL/L (ref 136–145)
SP GR UR REFRACTOMETRY: 1.03 (ref 1–1.03)
UROBILINOGEN UR QL STRIP.AUTO: 1 EU/DL (ref 0.2–1)
WBC # BLD AUTO: 14.3 K/UL (ref 4.6–13.2)
WBC URNS QL MICRO: NEGATIVE /HPF (ref 0–4)

## 2020-12-08 PROCEDURE — 74018 RADEX ABDOMEN 1 VIEW: CPT

## 2020-12-08 PROCEDURE — 96375 TX/PRO/DX INJ NEW DRUG ADDON: CPT

## 2020-12-08 PROCEDURE — 84703 CHORIONIC GONADOTROPIN ASSAY: CPT

## 2020-12-08 PROCEDURE — 74011000636 HC RX REV CODE- 636: Performed by: EMERGENCY MEDICINE

## 2020-12-08 PROCEDURE — 90471 IMMUNIZATION ADMIN: CPT

## 2020-12-08 PROCEDURE — 81001 URINALYSIS AUTO W/SCOPE: CPT

## 2020-12-08 PROCEDURE — 80053 COMPREHEN METABOLIC PANEL: CPT

## 2020-12-08 PROCEDURE — 74177 CT ABD & PELVIS W/CONTRAST: CPT

## 2020-12-08 PROCEDURE — 83690 ASSAY OF LIPASE: CPT

## 2020-12-08 PROCEDURE — 74011250636 HC RX REV CODE- 250/636: Performed by: EMERGENCY MEDICINE

## 2020-12-08 PROCEDURE — 96374 THER/PROPH/DIAG INJ IV PUSH: CPT

## 2020-12-08 PROCEDURE — 74011250637 HC RX REV CODE- 250/637: Performed by: EMERGENCY MEDICINE

## 2020-12-08 PROCEDURE — 90686 IIV4 VACC NO PRSV 0.5 ML IM: CPT | Performed by: EMERGENCY MEDICINE

## 2020-12-08 PROCEDURE — 85025 COMPLETE CBC W/AUTO DIFF WBC: CPT

## 2020-12-08 PROCEDURE — 99284 EMERGENCY DEPT VISIT MOD MDM: CPT

## 2020-12-08 RX ORDER — OXYCODONE AND ACETAMINOPHEN 5; 325 MG/1; MG/1
1 TABLET ORAL
Qty: 12 TAB | Refills: 0 | OUTPATIENT
Start: 2020-12-08 | End: 2020-12-08

## 2020-12-08 RX ORDER — KETOROLAC TROMETHAMINE 15 MG/ML
15 INJECTION, SOLUTION INTRAMUSCULAR; INTRAVENOUS
Status: COMPLETED | OUTPATIENT
Start: 2020-12-08 | End: 2020-12-08

## 2020-12-08 RX ORDER — ONDANSETRON 2 MG/ML
4 INJECTION INTRAMUSCULAR; INTRAVENOUS
Status: COMPLETED | OUTPATIENT
Start: 2020-12-08 | End: 2020-12-08

## 2020-12-08 RX ORDER — NAPROXEN 500 MG/1
500 TABLET ORAL 2 TIMES DAILY WITH MEALS
Qty: 20 TAB | Refills: 0 | Status: SHIPPED | OUTPATIENT
Start: 2020-12-08 | End: 2020-12-18

## 2020-12-08 RX ORDER — MORPHINE SULFATE 4 MG/ML
4 INJECTION, SOLUTION INTRAMUSCULAR; INTRAVENOUS
Status: COMPLETED | OUTPATIENT
Start: 2020-12-08 | End: 2020-12-08

## 2020-12-08 RX ORDER — DICYCLOMINE HYDROCHLORIDE 10 MG/1
20 CAPSULE ORAL
Status: COMPLETED | OUTPATIENT
Start: 2020-12-08 | End: 2020-12-08

## 2020-12-08 RX ORDER — HYDROCODONE BITARTRATE AND ACETAMINOPHEN 5; 325 MG/1; MG/1
1 TABLET ORAL
Qty: 6 TAB | Refills: 0 | OUTPATIENT
Start: 2020-12-08 | End: 2020-12-10

## 2020-12-08 RX ORDER — ONDANSETRON 4 MG/1
4 TABLET, ORALLY DISINTEGRATING ORAL
Qty: 20 TAB | Refills: 0 | Status: SHIPPED | OUTPATIENT
Start: 2020-12-08

## 2020-12-08 RX ADMIN — DICYCLOMINE HYDROCHLORIDE 20 MG: 10 CAPSULE ORAL at 21:06

## 2020-12-08 RX ADMIN — KETOROLAC TROMETHAMINE 15 MG: 15 INJECTION, SOLUTION INTRAMUSCULAR; INTRAVENOUS at 21:09

## 2020-12-08 RX ADMIN — SODIUM CHLORIDE 1000 ML: 900 INJECTION, SOLUTION INTRAVENOUS at 21:09

## 2020-12-08 RX ADMIN — MORPHINE SULFATE 4 MG: 4 INJECTION, SOLUTION INTRAMUSCULAR; INTRAVENOUS at 21:09

## 2020-12-08 RX ADMIN — SODIUM CHLORIDE 1000 ML: 900 INJECTION, SOLUTION INTRAVENOUS at 21:14

## 2020-12-08 RX ADMIN — INFLUENZA VIRUS VACCINE 0.5 ML: 15; 15; 15; 15 SUSPENSION INTRAMUSCULAR at 21:09

## 2020-12-08 RX ADMIN — ONDANSETRON 4 MG: 2 INJECTION INTRAMUSCULAR; INTRAVENOUS at 21:09

## 2020-12-08 RX ADMIN — IOPAMIDOL 100 ML: 612 INJECTION, SOLUTION INTRAVENOUS at 21:57

## 2020-12-09 NOTE — ED NOTES
RN to pt's room to review discharge instructions and pt states that she cannot take percocet as it makes her break out in a red, itchy rash. Provider made aware, will update pt's allergies.

## 2020-12-09 NOTE — ED PROVIDER NOTES
EMERGENCY DEPARTMENT HISTORY AND PHYSICAL EXAM    Date: 12/8/2020  Patient Name: Roselyn Tan    History of Presenting Illness     Chief Complaint   Patient presents with    Abdominal Pain         History Provided By: Patient    Chief Complaint: abdominal pain  Duration: 3-5 Days  Timing:  Constant  Location: lower abd  Quality: Aching  Severity: Moderate  Modifying Factors: had robotic repair of incarcerated incision hernia 12/2/2020  Associated Symptoms: denies any other associated signs or symptoms      Additional History (Context): Roselyn Tan is a 41 y.o. female presents ambulatory with complaints as noted. Notes since the surgery, her stretch marks look different. She has been taking Percocet and stool softeners since the surgery, notes nl BM yesterday. Denies fever, chills, vomiting, change in BM. She did not have fu with the surgeon yet. Surgeon Dr. Eliazar Rojas    PCP: None    Current Outpatient Medications   Medication Sig Dispense Refill    oxyCODONE-acetaminophen (Percocet) 5-325 mg per tablet Take 1 Tab by mouth every six (6) hours as needed for Pain for up to 3 days. Max Daily Amount: 4 Tabs. 12 Tab 0    ondansetron (Zofran ODT) 4 mg disintegrating tablet Take 1-2 tablets every 6-8 hours as needed for nausea and vomiting. 10 Tab 0       Past History     Past Medical History:  No past medical history on file. Past Surgical History:  Past Surgical History:   Procedure Laterality Date    ABDOMEN SURGERY PROC UNLISTED         Family History:  No family history on file. Social History:  Social History     Tobacco Use    Smoking status: Current Every Day Smoker     Packs/day: 0.50    Smokeless tobacco: Never Used   Substance Use Topics    Alcohol use: Not Currently    Drug use: Not Currently       Allergies:  No Known Allergies      Review of Systems   Review of Systems   Constitutional: Negative for activity change, appetite change, chills and fever. Eyes: Negative. Respiratory: Negative for chest tightness and shortness of breath. Cardiovascular: Negative for chest pain. Gastrointestinal: Positive for abdominal pain. Negative for constipation, diarrhea, nausea and vomiting. Genitourinary: Negative for difficulty urinating. Musculoskeletal: Negative. Skin: Negative for rash. Neurological: Negative for dizziness, syncope, weakness, light-headedness and headaches. Hematological: Negative for adenopathy. All other systems reviewed and are negative. All Other Systems Negative  Physical Exam     Vitals:    12/08/20 2130 12/08/20 2215 12/08/20 2230 12/08/20 2244   BP: (!) 144/65 121/73 114/64    Pulse:    84   Resp:       Temp:       SpO2: 98% 98% 98% 97%   Weight:       Height:         Physical Exam  Vitals signs and nursing note reviewed. Constitutional:       General: She is not in acute distress. Appearance: She is well-developed. She is not toxic-appearing or diaphoretic. HENT:      Head: Normocephalic and atraumatic. Nose: Nose normal.      Mouth/Throat:      Pharynx: Uvula midline. Eyes:      General: No scleral icterus. Neck:      Musculoskeletal: Normal range of motion and neck supple. Cardiovascular:      Rate and Rhythm: Normal rate and regular rhythm. Heart sounds: Normal heart sounds. Pulmonary:      Effort: Pulmonary effort is normal.      Breath sounds: Normal breath sounds. Abdominal:      General: A surgical scar is present. Bowel sounds are normal.      Palpations: Abdomen is soft. Tenderness: There is generalized abdominal tenderness. There is no right CVA tenderness, left CVA tenderness, guarding or rebound. Negative signs include Smith's sign, Rovsing's sign and McBurney's sign. Hernia: No hernia is present. Comments: Freshly healing surgical scars, incision sites, noted, no signs of poor healing or infection.  Pale yellow bruising present as expected after surgery, stretch marks in bruised area are slightly discolored. Musculoskeletal: Normal range of motion. Lymphadenopathy:      Cervical: No cervical adenopathy. Skin:     General: Skin is warm and dry. Coloration: Skin is not jaundiced. Neurological:      Mental Status: She is alert and oriented to person, place, and time. Deep Tendon Reflexes: Reflexes are normal and symmetric. Diagnostic Study Results     Labs -     Recent Results (from the past 12 hour(s))   CBC WITH AUTOMATED DIFF    Collection Time: 12/08/20  8:51 PM   Result Value Ref Range    WBC 14.3 (H) 4.6 - 13.2 K/uL    RBC 4.59 4.20 - 5.30 M/uL    HGB 12.4 12.0 - 16.0 g/dL    HCT 39.4 35.0 - 45.0 %    MCV 85.8 74.0 - 97.0 FL    MCH 27.0 24.0 - 34.0 PG    MCHC 31.5 31.0 - 37.0 g/dL    RDW 14.2 11.6 - 14.5 %    PLATELET 504 169 - 801 K/uL    MPV 10.8 9.2 - 11.8 FL    NEUTROPHILS 54 40 - 73 %    LYMPHOCYTES 28 21 - 52 %    MONOCYTES 13 (H) 3 - 10 %    EOSINOPHILS 5 0 - 5 %    BASOPHILS 0 0 - 2 %    ABS. NEUTROPHILS 7.6 1.8 - 8.0 K/UL    ABS. LYMPHOCYTES 4.0 (H) 0.9 - 3.6 K/UL    ABS. MONOCYTES 1.9 (H) 0.05 - 1.2 K/UL    ABS. EOSINOPHILS 0.8 (H) 0.0 - 0.4 K/UL    ABS. BASOPHILS 0.1 0.0 - 0.1 K/UL    DF AUTOMATED     METABOLIC PANEL, COMPREHENSIVE    Collection Time: 12/08/20  8:51 PM   Result Value Ref Range    Sodium 141 136 - 145 mmol/L    Potassium 4.1 3.5 - 5.5 mmol/L    Chloride 111 100 - 111 mmol/L    CO2 23 21 - 32 mmol/L    Anion gap 7 3.0 - 18 mmol/L    Glucose 82 74 - 99 mg/dL    BUN 15 7.0 - 18 MG/DL    Creatinine 0.88 0.6 - 1.3 MG/DL    BUN/Creatinine ratio 17 12 - 20      GFR est AA >60 >60 ml/min/1.73m2    GFR est non-AA >60 >60 ml/min/1.73m2    Calcium 8.8 8.5 - 10.1 MG/DL    Bilirubin, total 0.1 (L) 0.2 - 1.0 MG/DL    ALT (SGPT) 38 13 - 56 U/L    AST (SGOT) 16 10 - 38 U/L    Alk.  phosphatase 128 (H) 45 - 117 U/L    Protein, total 7.8 6.4 - 8.2 g/dL    Albumin 3.4 3.4 - 5.0 g/dL    Globulin 4.4 (H) 2.0 - 4.0 g/dL    A-G Ratio 0.8 0.8 - 1.7     LIPASE Collection Time: 12/08/20  8:51 PM   Result Value Ref Range    Lipase 102 73 - 393 U/L   HCG QL SERUM    Collection Time: 12/08/20  8:51 PM   Result Value Ref Range    HCG, Ql. Negative NEG     URINALYSIS W/ RFLX MICROSCOPIC    Collection Time: 12/08/20  8:55 PM   Result Value Ref Range    Color YELLOW      Appearance CLOUDY      Specific gravity 1.029 1.005 - 1.030      pH (UA) 5.0 5.0 - 8.0      Protein TRACE (A) NEG mg/dL    Glucose Negative NEG mg/dL    Ketone TRACE (A) NEG mg/dL    Bilirubin Negative NEG      Blood LARGE (A) NEG      Urobilinogen 1.0 0.2 - 1.0 EU/dL    Nitrites Negative NEG      Leukocyte Esterase TRACE (A) NEG     URINE MICROSCOPIC ONLY    Collection Time: 12/08/20  8:55 PM   Result Value Ref Range    WBC Negative 0 - 4 /hpf    RBC 0 to 3 0 - 5 /hpf    Epithelial cells 2+ 0 - 5 /lpf    Bacteria 2+ (A) NEG /hpf    Mucus 1+ (A) NEG /lpf       Radiologic Studies -   CT ABD PELV W CONT   Final Result   IMPRESSION:      There are postsurgical changes from ventral hernia repair with mesh. Inferior to   the umbilicus there is a fat-containing structure with stranding of the fat   which may represent recurrence of the hernia versus fat necrosis. Small fluid   collection is seen at the level of the umbilicus along the right side measuring   4.3 x 4 cm with differential diagnoses including postoperative hematoma/seroma   versus abscess. There is a sliver of fluid is seen adjacent to the mesh again   with a differential the same. There is also stranding of the subcutaneous   tissues lower anterior abdominal wall with skin thickening suggesting cellulitis   and/or postsurgical changes. XR ABD (KUB)    (Results Pending)     CT Results  (Last 48 hours)               12/08/20 2157  CT ABD PELV W CONT Final result    Impression:  IMPRESSION:       There are postsurgical changes from ventral hernia repair with mesh.  Inferior to   the umbilicus there is a fat-containing structure with stranding of the fat which may represent recurrence of the hernia versus fat necrosis. Small fluid   collection is seen at the level of the umbilicus along the right side measuring   4.3 x 4 cm with differential diagnoses including postoperative hematoma/seroma   versus abscess. There is a sliver of fluid is seen adjacent to the mesh again   with a differential the same. There is also stranding of the subcutaneous   tissues lower anterior abdominal wall with skin thickening suggesting cellulitis   and/or postsurgical changes. Narrative:  EXAM: CT of the abdomen and pelvis       INDICATION: Abdominal pain, status post robotic repair of incarcerated   incisional hernia with placement of mesh. COMPARISON: November 30, 2020       TECHNIQUE: Axial CT imaging of the abdomen and pelvis was performed with   intravenous contrast. Multiplanar reformats were generated. One or more dose   reduction techniques were used on this CT: automated exposure control,   adjustment of the mAs and/or kVp according to patient size, and iterative   reconstruction techniques. The specific techniques used on this CT exam have   been documented in the patient's electronic medical record. Digital Imaging and   Communications in Medicine (DICOM) format image data are available to   nonaffiliated external healthcare facilities or entities on a secure, media   free, reciprocally searchable basis with patient authorization for at least a   12-month period after this study. _______________       FINDINGS:       LOWER CHEST: Unremarkable. LIVER, BILIARY: Multiple rounded low attenuations are seen scattered throughout   the liver suggesting cysts or hemangiomas. Largest measures 18 mm in the lateral   segment of the left hepatic lobe. No biliary dilation. Gallbladder is   unremarkable. PANCREAS: Normal.       SPLEEN: Normal.       ADRENALS: There is left adrenal nodule measuring 14 mm suggesting a probable   adenoma. There is unchanged. KIDNEYS: Normal.       VASCULATURE: Unremarkable       LYMPH NODES: No enlarged lymph nodes. GASTROINTESTINAL TRACT: No bowel dilation or wall thickening. PELVIC ORGANS: Small amount of free fluid present in the pelvis. The uterus is   unremarkable. The urinary bladder is under distended therefore difficult to   evaluate. BONES: No acute or aggressive osseous abnormalities identified. OTHER: There are postoperative changes from ventral hernia repair. To the right   of the umbilicus is a 4.3 x 4 cm fluid collection suggesting postoperative   hematoma/seroma versus abscess. Inferior to the umbilicus in the subcutaneous   tissues there is a fat-containing structure measuring 5.6 x 6.3 cm with   stranding of the fat. This may represent fat necrosis or recurrence of the   ventral hernia. There is a mesh seen in place. There is no herniation of bowel. This stranding of the omentum. Small amount of elongated fluid collection seen   adjacent to the mesh measuring 3.3 x 1.2 cm, on image 106 axial series   suggesting postoperative hematoma or seroma. Few dots of gas are seen in the   subcutaneous tissues at the level of the umbilicus which I suspect is normal   postsurgical. There is stranding of the subcutaneous tissues lower anterior   abdominal wall suggesting postoperative changes and/or cellulitis.       _______________               CXR Results  (Last 48 hours)    None            Medical Decision Making   I am the first provider for this patient. I reviewed the vital signs, available nursing notes, past medical history, past surgical history, family history and social history. Vital Signs-Reviewed the patient's vital signs. Pulse Oximetry Analysis - 100% on RA    Records Reviewed: Nursing Notes and Old Medical Records    Procedures:  Procedures    Provider Notes (Medical Decision Making):     Pt presents with c/o abd pain s/p hernia repair 1 week ago.    Overall appears well, has no peritoneal signs, surgical sites appear well. Suspect she may have constipation from Percocet, post-op pain is also possible and expected. Will check labs, KUB. TURN OVER NOTE:   7:49 PM   Consulted with Mary TEJEDA concerning patient Esther Lagunas, standard discussion of reason for visit, HPI, ROS, PE, and current results available. Report was given at this time. Provider above will assume care at his time  Pending: all w/u ordered, KUB, labs  NIKHIL Renner      9:12 PM  After my examination of patient, she has moderate lower abd TTP, worse on the right; she is guarding and have ordered pain meds and CT scan  KUB shows no obstruction. CT scan shows no definite abscess; possible return of hernia vs fat necrosis. Have written her additional pain meds and advised her to f/up with Radha Hinds this week. Labs, VS stable. Plan, labs, VS, imaging discussed w/ED attending, Sayda Sawant, who came and evaluated patient at bedside. MED RECONCILIATION:  No current facility-administered medications for this encounter. Current Outpatient Medications   Medication Sig    oxyCODONE-acetaminophen (Percocet) 5-325 mg per tablet Take 1 Tab by mouth every six (6) hours as needed for Pain for up to 3 days. Max Daily Amount: 4 Tabs.  ondansetron (Zofran ODT) 4 mg disintegrating tablet Take 1-2 tablets every 6-8 hours as needed for nausea and vomiting.        Disposition: to be determined    Follow-up Information     Follow up With Specialties Details Why Contact Info    Lisa Anderson MD Surgery Schedule an appointment as soon as possible for a visit in 1 day  [phone] [facility] EMERGENCY DEPT Emergency Medicine  If symptoms worsen return immediately [id number]  [phone]          Current Discharge Medication List      START taking these medications    Details   oxyCODONE-acetaminophen (Percocet) 5-325 mg per tablet Take 1 Tab by mouth every six (6) hours as needed for Pain for up to 3 days. Max Daily Amount: 4 Tabs. Qty: 12 Tab, Refills: 0    Associated Diagnoses: Postoperative abdominal pain                 Diagnosis     Clinical Impression:   1. Postoperative abdominal pain          Dictation disclaimer:  Please note that this dictation was completed with [employer], the computer voice recognition software. Quite often unanticipated grammatical, syntax, homophones, and other interpretive errors are inadvertently transcribed by the computer software. Please disregard these errors. Please excuse any errors that have escaped final proofreading.

## 2020-12-09 NOTE — DISCHARGE INSTRUCTIONS
Patient Education        Dolor marylin después de irina cirugía: Instrucciones de cuidado  Acute Pain After Surgery: Care Instructions  Instrucciones de cuidado    Es normal tener algo de dolor después de irina operación. El dolor no significa que algo esté mal o que la cirugía no fuese deanna. Milad cuando el dolor es intenso, es importante que colabore con adams médico para controlarlo. También es importante que esté al tanto de la siguiente información sobre el dolor y los analgésicos (medicamentos para el dolor). · Usted es la única persona que sabe cómo se siente adams dolor. Así que asegúrese de informar al médico siempre que tenga dolor o cuando el dolor Arellano Little. De farhat modo, el médico podrá modificar bobbi medicamentos. · Con frecuencia, el dolor es más fácil de controlar alka cuando [place]. Así que lucia vez sea mejor anna dosis regulares de analgésicos (medicamentos para el dolor) en vez de esperar hasta que el dolor empeore. · Los medicamentos pueden ayudar a aliviar el dolor. Milad esto no significa que usted no tendrá Marilou Eulalio. Los medicamentos funcionan de manera que el dolor se mantenga a un nivel que usted pueda soportar. Con el tiempo, usted se sentirá mejor. La atención de seguimiento es irina parte clave de adams tratamiento y seguridad. Asegúrese de hacer y acudir a todas las citas, y llame a adams médico si está teniendo problemas. También es irina buena idea saber los resultados de bobbi exámenes y mantener irina lista de los medicamentos que richelle. ¿Cómo puede cuidarse en el hogar? · Sea shanel con los medicamentos. Leslee y siga todas las instrucciones de la [place]. ? Si el [employer] luke un analgésico recetado, tómelo daron se lo indicó. ? Si usted no está tomando un analgésico recetado, pregúntele a adams médico si puede anna un medicamento de [employer].   · Si richelle analgésicos de venta kayla, daron acetaminofén (Tylenol), ibuprofeno (Advil, Motrin) o naproxeno (Aleve), leslee y siga todas las instrucciones de la etiqueta. · No tome dos o más analgésicos al mismo tiempo a menos que el médico se lo haya indicado. · No francine alcohol mientras esté tomando analgésicos. · Trate de caminar cada día si el médico se lo recomienda. Comience caminando un poco más de lo que caminó el día anterior. Poco a poco, aumente la cantidad que camina. Caminar aumenta el flujo de [place]. También ayuda a prevenir la neumonía y el estreñimiento. · Para prevenir el estreñimiento causado por los analgésicos opioides:  ? Consulte con adams médico acerca de anna un laxante. ? Incluya en adams alimentación frutas, verduras, frijoles (habichuelas) y granos integrales todos los días. Estos alimentos son ricos en fibra. ? Francine abundantes líquidos, los suficientes daron para que adams orina sea de color amarillo elida o transparente daron el agua. [place], jugo de frutas u otras bebidas que no contengan cafeína ni alcohol. Si tiene enfermedad de los riñones, del corazón o del hígado y tiene que [place] líquidos, hable con adams médico antes de aumentar la cantidad de líquidos que selena. ? Si es necesario, tome un suplemento de [place], daron Citrucel o Metamucil, todos los GRASSE. [place] y siga todas las indicaciones de la [place]. Si richelle analgésicos adeline más de unos días, hable con adams médico antes de [employer]. ¿Cuándo debe pedir ayuda? Llame a adams médico ahora mismo o busque atención médica inmediata si:    · El dolor empeora.     · El medicamento no controla el dolor. Vigile muy de cerca los cambios en adams ami, y asegúrese de comunicarse con adams médico si tiene algún problema. ¿Dónde puede encontrar más información en inglés? Vaya a http://www.gray.com/  Escriba H549 en la búsqueda para aprender más acerca de \"Dolor marylin después de [place] cirugía: Instrucciones de cuidado. \"  Revisado: 26 junio, 4307               JAMILA del contenido: 12.6  © 1948-4017 [employer]wise, Incorporated.    Las instrucciones de cuidado fueron adaptadas bajo licencia por [employer] (which disclaims liability or warranty for this information). Si usted tiene [place] afección médica o sobre estas instrucciones, siempre pregunte a adams profesional de ami. [facility] niega toda garantía o responsabilidad por adams uso de esta información.

## 2020-12-09 NOTE — ED TRIAGE NOTES
Pt presents ambulatory to ED via triage from home with complaints of lower middle and right abdominal pain x 3 days. Tender to palpation.  Hernia repair 12/2/2020

## 2020-12-10 ENCOUNTER — HOSPITAL ENCOUNTER (EMERGENCY)
Age: 45
Discharge: HOME | End: 2020-12-10
Attending: EMERGENCY MEDICINE | Admitting: EMERGENCY MEDICINE
Payer: MEDICARE

## 2020-12-10 ENCOUNTER — APPOINTMENT (OUTPATIENT)
Dept: CT IMAGING | Age: 45
End: 2020-12-10
Attending: PHYSICIAN ASSISTANT
Payer: MEDICARE

## 2020-12-10 VITALS
WEIGHT: 246 LBS | DIASTOLIC BLOOD PRESSURE: 67 MMHG | SYSTOLIC BLOOD PRESSURE: 132 MMHG | HEIGHT: 69 IN | OXYGEN SATURATION: 97 % | TEMPERATURE: 98 F | HEART RATE: 99 BPM | RESPIRATION RATE: 17 BRPM | BODY MASS INDEX: 36.43 KG/M2

## 2020-12-10 DIAGNOSIS — R10.9 POSTOPERATIVE ABDOMINAL PAIN: Primary | ICD-10-CM

## 2020-12-10 DIAGNOSIS — G89.18 POSTOPERATIVE ABDOMINAL PAIN: Primary | ICD-10-CM

## 2020-12-10 LAB
ALBUMIN SERPL-MCNC: 3.1 G/DL (ref 3.4–5)
ALBUMIN/GLOB SERPL: 0.6 {RATIO} (ref 0.8–1.7)
ALP SERPL-CCNC: 120 U/L (ref 45–117)
ALT SERPL-CCNC: 30 U/L (ref 13–56)
ANION GAP SERPL CALC-SCNC: 5 MMOL/L (ref 3–18)
APPEARANCE UR: ABNORMAL
AST SERPL-CCNC: 12 U/L (ref 10–38)
BACTERIA URNS QL MICRO: NEGATIVE /HPF
BASOPHILS # BLD: 0.1 K/UL (ref 0–0.1)
BASOPHILS NFR BLD: 1 % (ref 0–2)
BILIRUB SERPL-MCNC: 0.2 MG/DL (ref 0.2–1)
BILIRUB UR QL: NEGATIVE
BUN SERPL-MCNC: 15 MG/DL (ref 7–18)
BUN/CREAT SERPL: 15 (ref 12–20)
CALCIUM SERPL-MCNC: 8.8 MG/DL (ref 8.5–10.1)
CHLORIDE SERPL-SCNC: 111 MMOL/L (ref 100–111)
CO2 SERPL-SCNC: 26 MMOL/L (ref 21–32)
COLOR UR: YELLOW
CREAT SERPL-MCNC: 1.01 MG/DL (ref 0.6–1.3)
DIFFERENTIAL METHOD BLD: ABNORMAL
EOSINOPHIL # BLD: 0.6 K/UL (ref 0–0.4)
EOSINOPHIL NFR BLD: 6 % (ref 0–5)
EPITH CASTS URNS QL MICRO: NORMAL /LPF (ref 0–5)
ERYTHROCYTE [DISTWIDTH] IN BLOOD BY AUTOMATED COUNT: 14.3 % (ref 11.6–14.5)
GLOBULIN SER CALC-MCNC: 4.8 G/DL (ref 2–4)
GLUCOSE SERPL-MCNC: 100 MG/DL (ref 74–99)
GLUCOSE UR STRIP.AUTO-MCNC: NEGATIVE MG/DL
HCG SERPL QL: NEGATIVE
HCT VFR BLD AUTO: 37.6 % (ref 35–45)
HGB BLD-MCNC: 11.7 G/DL (ref 12–16)
HGB UR QL STRIP: ABNORMAL
KETONES UR QL STRIP.AUTO: ABNORMAL MG/DL
LEUKOCYTE ESTERASE UR QL STRIP.AUTO: NEGATIVE
LIPASE SERPL-CCNC: 101 U/L (ref 73–393)
LYMPHOCYTES # BLD: 2.6 K/UL (ref 0.9–3.6)
LYMPHOCYTES NFR BLD: 25 % (ref 21–52)
MCH RBC QN AUTO: 27.1 PG (ref 24–34)
MCHC RBC AUTO-ENTMCNC: 31.1 G/DL (ref 31–37)
MCV RBC AUTO: 87 FL (ref 74–97)
MONOCYTES # BLD: 1.1 K/UL (ref 0.05–1.2)
MONOCYTES NFR BLD: 10 % (ref 3–10)
NEUTS SEG # BLD: 6.1 K/UL (ref 1.8–8)
NEUTS SEG NFR BLD: 58 % (ref 40–73)
NITRITE UR QL STRIP.AUTO: NEGATIVE
PH UR STRIP: 5 [PH] (ref 5–8)
PLATELET # BLD AUTO: 365 K/UL (ref 135–420)
PMV BLD AUTO: 10.7 FL (ref 9.2–11.8)
POTASSIUM SERPL-SCNC: 3.9 MMOL/L (ref 3.5–5.5)
PROT SERPL-MCNC: 7.9 G/DL (ref 6.4–8.2)
PROT UR STRIP-MCNC: 30 MG/DL
RBC # BLD AUTO: 4.32 M/UL (ref 4.2–5.3)
RBC #/AREA URNS HPF: NORMAL /HPF (ref 0–5)
SODIUM SERPL-SCNC: 142 MMOL/L (ref 136–145)
SP GR UR REFRACTOMETRY: 1.03 (ref 1–1.03)
UROBILINOGEN UR QL STRIP.AUTO: 1 EU/DL (ref 0.2–1)
WBC # BLD AUTO: 10.4 K/UL (ref 4.6–13.2)
WBC URNS QL MICRO: NORMAL /HPF (ref 0–4)

## 2020-12-10 PROCEDURE — 81001 URINALYSIS AUTO W/SCOPE: CPT

## 2020-12-10 PROCEDURE — 99284 EMERGENCY DEPT VISIT MOD MDM: CPT

## 2020-12-10 PROCEDURE — 85025 COMPLETE CBC W/AUTO DIFF WBC: CPT

## 2020-12-10 PROCEDURE — 74011250636 HC RX REV CODE- 250/636: Performed by: EMERGENCY MEDICINE

## 2020-12-10 PROCEDURE — 84703 CHORIONIC GONADOTROPIN ASSAY: CPT

## 2020-12-10 PROCEDURE — 74177 CT ABD & PELVIS W/CONTRAST: CPT

## 2020-12-10 PROCEDURE — 74011000636 HC RX REV CODE- 636: Performed by: EMERGENCY MEDICINE

## 2020-12-10 PROCEDURE — 93005 ELECTROCARDIOGRAM TRACING: CPT

## 2020-12-10 PROCEDURE — 80053 COMPREHEN METABOLIC PANEL: CPT

## 2020-12-10 PROCEDURE — 96374 THER/PROPH/DIAG INJ IV PUSH: CPT

## 2020-12-10 PROCEDURE — 83690 ASSAY OF LIPASE: CPT

## 2020-12-10 RX ORDER — MORPHINE SULFATE 2 MG/ML
2 INJECTION, SOLUTION INTRAMUSCULAR; INTRAVENOUS
Status: COMPLETED | OUTPATIENT
Start: 2020-12-10 | End: 2020-12-10

## 2020-12-10 RX ORDER — PROMETHAZINE HYDROCHLORIDE 12.5 MG/1
6.25 SUPPOSITORY RECTAL
Qty: 5 SUPPOSITORY | Refills: 0 | Status: SHIPPED | OUTPATIENT
Start: 2020-12-10

## 2020-12-10 RX ORDER — MORPHINE SULFATE 4 MG/ML
2 INJECTION, SOLUTION INTRAMUSCULAR; INTRAVENOUS
Status: DISCONTINUED | OUTPATIENT
Start: 2020-12-10 | End: 2020-12-10

## 2020-12-10 RX ORDER — ACETAMINOPHEN 325 MG/1
1000 TABLET ORAL
Qty: 20 TAB | Refills: 0 | Status: SHIPPED | OUTPATIENT
Start: 2020-12-10

## 2020-12-10 RX ADMIN — IOPAMIDOL 97 ML: 612 INJECTION, SOLUTION INTRAVENOUS at 20:59

## 2020-12-10 RX ADMIN — MORPHINE SULFATE 2 MG: 2 INJECTION, SOLUTION INTRAMUSCULAR; INTRAVENOUS at 20:47

## 2020-12-11 LAB
ATRIAL RATE: 92 BPM
CALCULATED P AXIS, ECG09: 42 DEGREES
CALCULATED R AXIS, ECG10: 27 DEGREES
CALCULATED T AXIS, ECG11: 49 DEGREES
DIAGNOSIS, 93000: NORMAL
P-R INTERVAL, ECG05: 118 MS
Q-T INTERVAL, ECG07: 350 MS
QRS DURATION, ECG06: 90 MS
QTC CALCULATION (BEZET), ECG08: 432 MS
VENTRICULAR RATE, ECG03: 92 BPM

## 2020-12-11 NOTE — ED NOTES
I performed a brief evaluation, including history and physical, of the patient here in triage and I have determined that pt will need further treatment and evaluation from the main side ER physician. I have placed initial orders to help in expediting patients care.      December 10, 2020 at 7:21 PM - Eros Lin

## 2020-12-11 NOTE — DISCHARGE INSTRUCTIONS
Patient Education        Dolor marylin después de irina cirugía: Instrucciones de cuidado  Acute Pain After Surgery: Care Instructions  Instrucciones de cuidado    Es normal tener algo de dolor después de irina operación. El dolor no significa que algo esté mal o que la cirugía no fuese deanna. Milad cuando el dolor es intenso, es importante que colabore con adams médico para controlarlo. También es importante que esté al tanto de la siguiente información sobre el dolor y los analgésicos (medicamentos para el dolor). · Usted es la única persona que sabe cómo se siente adams dolor. Así que asegúrese de informar al médico siempre que tenga dolor o cuando el dolor Arellano Little. De farhat modo, el médico podrá modificar bobbi medicamentos. · Con frecuencia, el dolor es más fácil de controlar alka cuando [place]. Así que lucia vez sea mejor anna dosis regulares de analgésicos (medicamentos para el dolor) en vez de esperar hasta que el dolor empeore. · Los medicamentos pueden ayudar a aliviar el dolor. Milad esto no significa que usted no tendrá Marilou Eulalio. Los medicamentos funcionan de manera que el dolor se mantenga a un nivel que usted pueda soportar. Con el tiempo, usted se sentirá mejor. La atención de seguimiento es irina parte clave de adams tratamiento y seguridad. Asegúrese de hacer y acudir a todas las citas, y llame a adams médico si está teniendo problemas. También es irina buena idea saber los resultados de bobbi exámenes y mantener irina lista de los medicamentos que richelle. ¿Cómo puede cuidarse en el hogar? · Sea shanel con los medicamentos. Leslee y siga todas las instrucciones de la [place]. ? Si el [employer] luke un analgésico recetado, tómelo daron se lo indicó. ? Si usted no está tomando un analgésico recetado, pregúntele a adams médico si puede anna un medicamento de [employer].   · Si richelle analgésicos de venta kayla, daron acetaminofén (Tylenol), ibuprofeno (Advil, Motrin) o naproxeno (Aleve), leslee y siga todas las instrucciones de la etiqueta. · No tome dos o más analgésicos al mismo tiempo a menos que el médico se lo haya indicado. · No francine alcohol mientras esté tomando analgésicos. · Trate de caminar cada día si el médico se lo recomienda. Comience caminando un poco más de lo que caminó el día anterior. Poco a poco, aumente la cantidad que camina. Caminar aumenta el flujo de [place]. También ayuda a prevenir la neumonía y el estreñimiento. · Para prevenir el estreñimiento causado por los analgésicos opioides:  ? Consulte con adams médico acerca de anna un laxante. ? Incluya en adams alimentación frutas, verduras, frijoles (habichuelas) y granos integrales todos los días. Estos alimentos son ricos en fibra. ? Francine abundantes líquidos, los suficientes daron para que adams orina sea de color amarillo elida o transparente daron el agua. [place] D[place], jugo de frutas u otras bebidas que no contengan cafeína ni alcohol. Si tiene enfermedad de los riñones, del corazón o del hígado y tiene que [place] líquidos, hable con adams médico antes de aumentar la cantidad de líquidos que selena. ? Si es necesario, tome un suplemento de [place], daron Cit[employer]cel o Metamucil, todos los GRASSE. [place] y siga todas las indicaciones de la [place]. Si richelle analgésicos adeline más de unos días, hable con adams médico antes de [employer]. ¿Cuándo debe pedir ayuda? Llame a adams médico ahora mismo o busque atención médica inmediata si:    · El dolor empeora.     · El medicamento no controla el dolor. Vigile muy de cerca los cambios en adams ami, y asegúrese de comunicarse con adams médico si tiene algún problema. ¿Dónde puede encontrar más información en inglés? Vaya a http://www.gray.com/  Escriba H549 en la búsqueda para aprender más acerca de \"Dolor marylin después de [employer] cirugía: Instrucciones de cuidado. \"  Revisado: 26 junio, 8077               JFFBMDF del contenido: 12.6  © 7199-7261 [facility]orporated.    Las instrucciones de cuidado fueron adaptadas bajo licencia por [employer] (which disclaims liability or warranty for this information). Si usted tiene [place] afección médica o sobre estas instrucciones, siempre pregunte a adams profesional de ami. [facility] niega toda garantía o responsabilidad por adams uso de esta información.

## 2020-12-11 NOTE — ED PROVIDER NOTES
EMERGENCY DEPARTMENT HISTORY AND PHYSICAL EXAM      Date: 12/10/2020  Patient Name: Eileen Oliver    History of Presenting Illness     Chief Complaint   Patient presents with    Abdominal Pain    Back Pain       History Provided By: Patient    HPI: Eileen Oliver, 41 y.o. female PMHx significant for ventral hernia presents ambulatory to the ED with cc of continued abd pain. Pt s/p ventral hernia repair performed by Dr. Juan Mckeon on 12/2. Pt reports continued pain. Pt states she has been prescribed percocet and vicodin which \"makes my heart race like a racehorse and feel like i'm dying\". Denies CP, SOB and dizziness currently. Last BM two days ago. Pt has been taking milk of magnesia. P reports one bout of emesis. Pt reports she has been able to tolerate small meals without emesis. Pt missed f/u appt with general surgeon. Denies fever/chills. There are no other complaints, changes, or physical findings at this time. PCP: None    No current facility-administered medications on file prior to encounter. Current Outpatient Medications on File Prior to Encounter   Medication Sig Dispense Refill    ondansetron (Zofran ODT) 4 mg disintegrating tablet 1 Tab by SubLINGual route every eight (8) hours as needed for Nausea or Vomiting. 20 Tab 0    naproxen (Naprosyn) 500 mg tablet Take 1 Tab by mouth two (2) times daily (with meals) for 10 days. 20 Tab 0    [DISCONTINUED] HYDROcodone-acetaminophen (Norco) 5-325 mg per tablet Take 1 Tab by mouth every eight (8) hours as needed for Pain for up to 3 days. Max Daily Amount: 3 Tabs. 6 Tab 0       Past History     Past Medical History:  History reviewed. No pertinent past medical history. Past Surgical History:  Past Surgical History:   Procedure Laterality Date    ABDOMEN SURGERY PROC UNLISTED         Family History:  History reviewed. No pertinent family history.     Social History:  Social History     Tobacco Use    Smoking status: Current Every Day Smoker     Packs/day: 0.50    Smokeless tobacco: Never Used   Substance Use Topics    Alcohol use: Not Currently    Drug use: Not Currently       Allergies: Allergies   Allergen Reactions    Percocet [Oxycodone-Acetaminophen] Hives         Review of Systems   Review of Systems   Constitutional: Negative for chills and fever. Respiratory: Negative for shortness of breath. Cardiovascular: Negative for chest pain. Gastrointestinal: Positive for abdominal pain and vomiting. Negative for nausea. Genitourinary: Negative for flank pain. Musculoskeletal: Negative for back pain and myalgias. Skin: Negative for color change, pallor, rash and wound. Neurological: Negative for dizziness, weakness and light-headedness. All other systems reviewed and are negative. Physical Exam   Physical Exam  Vitals signs and nursing note reviewed. Constitutional:       General: She is not in acute distress. Appearance: She is well-developed. Comments: Pt in NAD   HENT:      Head: Normocephalic and atraumatic. Eyes:      Conjunctiva/sclera: Conjunctivae normal.   Cardiovascular:      Rate and Rhythm: Normal rate and regular rhythm. Heart sounds: Normal heart sounds. Pulmonary:      Effort: Pulmonary effort is normal. No respiratory distress. Breath sounds: Normal breath sounds. Abdominal:      General: Bowel sounds are normal. There is no distension. Palpations: Abdomen is soft. Comments: Stretch marks to skin  Mass palpated distal to umbilicus, no overlying skin changes  Abdomen distended  No guarding or rigidity     Musculoskeletal: Normal range of motion. Skin:     General: Skin is warm. Findings: No rash. Neurological:      Mental Status: She is alert and oriented to person, place, and time.    Psychiatric:         Behavior: Behavior normal.         Diagnostic Study Results     Labs -     Recent Results (from the past 12 hour(s))   URINALYSIS W/ RFLX MICROSCOPIC    Collection Time: 12/10/20  7:40 PM   Result Value Ref Range    Color YELLOW      Appearance CLOUDY      Specific gravity 1.029 1.005 - 1.030      pH (UA) 5.0 5.0 - 8.0      Protein 30 (A) NEG mg/dL    Glucose Negative NEG mg/dL    Ketone TRACE (A) NEG mg/dL    Bilirubin Negative NEG      Blood LARGE (A) NEG      Urobilinogen 1.0 0.2 - 1.0 EU/dL    Nitrites Negative NEG      Leukocyte Esterase Negative NEG     URINE MICROSCOPIC ONLY    Collection Time: 12/10/20  7:40 PM   Result Value Ref Range    WBC 0 to 3 0 - 4 /hpf    RBC 11 to 20 0 - 5 /hpf    Epithelial cells 3+ 0 - 5 /lpf    Bacteria Negative NEG /hpf   CBC WITH AUTOMATED DIFF    Collection Time: 12/10/20  7:53 PM   Result Value Ref Range    WBC 10.4 4.6 - 13.2 K/uL    RBC 4.32 4.20 - 5.30 M/uL    HGB 11.7 (L) 12.0 - 16.0 g/dL    HCT 37.6 35.0 - 45.0 %    MCV 87.0 74.0 - 97.0 FL    MCH 27.1 24.0 - 34.0 PG    MCHC 31.1 31.0 - 37.0 g/dL    RDW 14.3 11.6 - 14.5 %    PLATELET 006 631 - 059 K/uL    MPV 10.7 9.2 - 11.8 FL    NEUTROPHILS 58 40 - 73 %    LYMPHOCYTES 25 21 - 52 %    MONOCYTES 10 3 - 10 %    EOSINOPHILS 6 (H) 0 - 5 %    BASOPHILS 1 0 - 2 %    ABS. NEUTROPHILS 6.1 1.8 - 8.0 K/UL    ABS. LYMPHOCYTES 2.6 0.9 - 3.6 K/UL    ABS. MONOCYTES 1.1 0.05 - 1.2 K/UL    ABS. EOSINOPHILS 0.6 (H) 0.0 - 0.4 K/UL    ABS. BASOPHILS 0.1 0.0 - 0.1 K/UL    DF AUTOMATED     METABOLIC PANEL, COMPREHENSIVE    Collection Time: 12/10/20  7:53 PM   Result Value Ref Range    Sodium 142 136 - 145 mmol/L    Potassium 3.9 3.5 - 5.5 mmol/L    Chloride 111 100 - 111 mmol/L    CO2 26 21 - 32 mmol/L    Anion gap 5 3.0 - 18 mmol/L    Glucose 100 (H) 74 - 99 mg/dL    BUN 15 7.0 - 18 MG/DL    Creatinine 1.01 0.6 - 1.3 MG/DL    BUN/Creatinine ratio 15 12 - 20      GFR est AA >60 >60 ml/min/1.73m2    GFR est non-AA 60 (L) >60 ml/min/1.73m2    Calcium 8.8 8.5 - 10.1 MG/DL    Bilirubin, total 0.2 0.2 - 1.0 MG/DL    ALT (SGPT) 30 13 - 56 U/L    AST (SGOT) 12 10 - 38 U/L    Alk. phosphatase 120 (H) 45 - 117 U/L    Protein, total 7.9 6.4 - 8.2 g/dL    Albumin 3.1 (L) 3.4 - 5.0 g/dL    Globulin 4.8 (H) 2.0 - 4.0 g/dL    A-G Ratio 0.6 (L) 0.8 - 1.7     LIPASE    Collection Time: 12/10/20  7:53 PM   Result Value Ref Range    Lipase 101 73 - 393 U/L   HCG QL SERUM    Collection Time: 12/10/20  7:53 PM   Result Value Ref Range    HCG, Ql. Negative NEG         Radiologic Studies -   CT ABD PELV W CONT   Final Result   IMPRESSION:      There are postoperative changes from ventral hernia repair. There is again seen   a mixed fat density in the infraumbilical region similar in size to the prior   exam with stranding of the fat. Differential diagnosis again includes recurrence   of the hernia versus incomplete reduction and/or fat necrosis. Small adjacent   fluid collection is seen along the right side of the umbilicus with differential   diagnoses including postoperative hematoma/seroma versus abscess. This is   unchanged. There is an abdominal mesh in place with adjacent elongated fluid collection   which is increased in size from prior exam. Differential diagnoses for this   collection includes postoperative hematoma/seroma versus abscess. There is infiltration of the omental fat which suggesting postoperative changes   and/or inflammation/infection. There is also some stranding of the subcutaneous tissues lower anterior   abdominal wall suggesting cellulitis. Probable left adrenal adenoma. CT Results  (Last 48 hours)               12/10/20 2106  CT ABD PELV W CONT Final result    Impression:  IMPRESSION:       There are postoperative changes from ventral hernia repair. There is again seen   a mixed fat density in the infraumbilical region similar in size to the prior   exam with stranding of the fat. Differential diagnosis again includes recurrence   of the hernia versus incomplete reduction and/or fat necrosis.  Small adjacent   fluid collection is seen along the right side of the umbilicus with differential   diagnoses including postoperative hematoma/seroma versus abscess. This is   unchanged. There is an abdominal mesh in place with adjacent elongated fluid collection   which is increased in size from prior exam. Differential diagnoses for this   collection includes postoperative hematoma/seroma versus abscess. There is infiltration of the omental fat which suggesting postoperative changes   and/or inflammation/infection. There is also some stranding of the subcutaneous tissues lower anterior   abdominal wall suggesting cellulitis. Probable left adrenal adenoma. Narrative:  EXAM: CT of the abdomen and pelvis       INDICATION: Status post ventral hernia, tenderness. The umbilicus with palpated   mass. COMPARISON: CT dated December 8, 2020       TECHNIQUE: Axial CT imaging of the abdomen and pelvis was performed with   intravenous contrast. Multiplanar reformats were generated. One or more dose   reduction techniques were used on this CT: automated exposure control,   adjustment of the mAs and/or kVp according to patient size, and iterative   reconstruction techniques. The specific techniques used on this CT exam have   been documented in the patient's electronic medical record. Digital Imaging and   Communications in Medicine (DICOM) format image data are available to   nonaffiliated external healthcare facilities or entities on a secure, media   free, reciprocally searchable basis with patient authorization for at least a   12-month period after this study. _______________       FINDINGS:       LOWER CHEST: Unremarkable. LIVER, BILIARY: Again seen are multiple probable hepatic cyst. No biliary   dilation. Gallbladder is unremarkable.        PANCREAS: Normal.       SPLEEN: Normal.       ADRENALS: Right adrenal is normal. There is a left adrenal nodule suggesting a   probable adenoma measuring 14 mm incompletely evaluated due to presence of   contrast on board. KIDNEYS: Small probable cortical cyst seen in the upper pole the right kidney. Kidneys demonstrate no hydronephrosis or nephrolithiasis. VASCULATURE: Unremarkable       LYMPH NODES: No enlarged lymph nodes. GASTROINTESTINAL TRACT: No bowel dilation or wall thickening. Appendix is   normal. Bowel obstruction seen. PELVIC ORGANS: Unremarkable. BONES: No acute or aggressive osseous abnormalities identified. OTHER: In the slightly infra umbilical region there is again seen mixed fat   density Measuring 5.8 x 5.5 x 5.2 cm similar in size to prior exam. Differential   diagnosis for this fat density includes recurrence of hernia versus incomplete   reduction of the hernia with possible fat necrosis. There is a stable small   fluid collection measuring 4 x 4 cm, with differential diagnoses including   postoperative hematoma/seroma versus abscess. There is stranding of the subcutaneous tissues of the lower anterior abdominal   wall inferior to this fat density at the umbilical site. Intra-abdominally adjacent to the mesh there is a elongated fluid collection   which appears to be slightly increased in size measuring 4.9 x 1.1 cm which   previously measured 3.4 x 1.2 cm. There is a mesh in place. There is stranding   of the omentum deep to the mesh which may represent postoperative changes and/or   inflammation/infection of the omentum. Infection of the fluid collection   adjacent to the mesh cannot be completely excluded.       _______________               CXR Results  (Last 48 hours)    None          Medical Decision Making   I am the first provider for this patient. I reviewed the vital signs, available nursing notes, past medical history, past surgical history, family history and social history. Vital Signs-Reviewed the patient's vital signs.   Patient Vitals for the past 12 hrs:   Temp Pulse Resp BP SpO2   12/10/20 1958     100 % 12/10/20 1924 98 °F (36.7 °C) 99 17 123/80 100 %       Records Reviewed: Nursing Notes and Old Medical Records    Provider Notes (Medical Decision Making):   DDx: Post op pain, Ventral hernia, Abscess, Seroma, Hematoma    40 yo F who presents with continued abd pain s/p ventral hernia repair 8 days ago. On exam mass located distal to umbilicus with tenderness on exam.  No overlying skin changes. No leukocytosis or left shift. CT scan unchanged from 2 days prior. Spoke with Dr. George Boyd, general surgeon. She recommended pain control with Tylenol and ibuprofen with prompt outpatient follow-up with general surgery. Patient is in agreement. Patient nontoxic-appearing in NAD. Patient stable for prompt outpatient follow-up with PCP general surgery in 1 to 2 days. Patient given strict instructions to return if symptoms worsen. ED Course:   Initial assessment performed. The patients presenting problems have been discussed, and they are in agreement with the care plan formulated and outlined with them. I have encouraged them to ask questions as they arise throughout their visit. 2025  Spoke with Dr. Abner Euceda radiologist. He reports increased inflammation and increased fluid collection with possible abscess or hematoma. He reports concern for recurrence vs incomplete reduction of hernia. Consult placed for general surgery. 2145  Spoke with Dr. George Boyd, consult for general surgery. This patient's presentation, physical exam findings and CT scan. She states she will review CT scan and call back with further recommendations. 5  Dr. George Boyd reviewed CT scan. She reports possible hernia recurrence versus incomplete reduction of hernia with seroma. Confirmed with pt she is able to tolerate food without emesis. Dr George Boyd recommends tylenol and ibuprofen for pain control with prompt outpatient f/u with Dr. Arlette Murillo. She reports no bowel involved in hernia and emergent repair is not required.      Spoke with pt and discussed Dr. David Fay. Patient states she takes Tylenol ibuprofen together gives her lower leg pain. Patient also reports headaches when taking Zofran. Discussed will prescribe Tylenol and Phenergan suppositories. Stressed to patient importance of prompt follow-up with Dr. Phani Chester tomorrow morning. Patient with strict instructions to return if she experiences fever, chills, vomiting or worsening pain. All questions answered. Disposition:  10:41 PM  Discussed lab and imaging results with pt along with dx and treatment plan. Discussed importance of PCP and general surgery follow up. All questions answered. Pt voiced they understood. Return if sx worsen. PLAN:  1. Current Discharge Medication List      START taking these medications    Details   acetaminophen (TYLENOL) 325 mg tablet Take 3 Tabs by mouth every six (6) hours as needed for Pain. Qty: 20 Tab, Refills: 0      promethazine (Phenergan) 12.5 mg suppository Insert 0.5 Suppositories into rectum every six (6) hours as needed for Nausea. Qty: 5 Suppository, Refills: 0         STOP taking these medications       HYDROcodone-acetaminophen (Norco) 5-325 mg per tablet Comments:   Reason for Stoppin.   Follow-up Information    None       Return to ED if worse     Diagnosis     Clinical Impression:   1. Postoperative abdominal pain        Attestations:    NIKHIL Bernal    Please note that this dictation was completed with [employer], the computer voice recognition software. Quite often unanticipated grammatical, syntax, homophones, and other interpretive errors are inadvertently transcribed by the computer software. Please disregard these errors. Please excuse any errors that have escaped final proofreading. Thank you.

## 2020-12-11 NOTE — ED TRIAGE NOTES
Patient states that she has been in the ED multiple times given Vicodin which makes her heart race- states that she is not better

## (undated) DEVICE — ARM DRAPE

## (undated) DEVICE — DRAPE,UTILITY,TAPE,15X26,STERILE: Brand: MEDLINE

## (undated) DEVICE — DERMABOND SKIN ADH 0.7ML -- DERMABOND ADVANCED 12/BX

## (undated) DEVICE — COLUMN DRAPE

## (undated) DEVICE — COVER LT HNDL BLU PLAS

## (undated) DEVICE — LIGHT HANDLE: Brand: DEVON

## (undated) DEVICE — NDL PRT INJ NSAF BLNT 18GX1.5 --

## (undated) DEVICE — ANTI-FOG SOLUTION WITH FOAM PAD: Brand: DEVON

## (undated) DEVICE — SOL IRR NACL 0.9% 500ML POUR --

## (undated) DEVICE — KENDALL SCD EXPRESS SLEEVES, KNEE LENGTH, MEDIUM: Brand: KENDALL SCD

## (undated) DEVICE — SEAL UNIV 5-8MM DISP BX/10 -- DA VINCI XI - SNGL USE

## (undated) DEVICE — REM POLYHESIVE ADULT PATIENT RETURN ELECTRODE: Brand: VALLEYLAB

## (undated) DEVICE — Device

## (undated) DEVICE — STERILE POLYISOPRENE POWDER-FREE SURGICAL GLOVES: Brand: PROTEXIS

## (undated) DEVICE — SUTURE MCRYL SZ 4-0 L18IN ABSRB UD L19MM PS-2 3/8 CIR PRIM Y496G

## (undated) DEVICE — BLADELESS OBTURATOR: Brand: WECK VISTA

## (undated) DEVICE — (D)PREP SKN CHLRAPRP APPL 26ML -- CONVERT TO ITEM 371833

## (undated) DEVICE — SUTURE VCRL SZ 2-0 L27IN ABSRB UD L26MM SH 1/2 CIR J417H

## (undated) DEVICE — TIP COVER ACCESSORY

## (undated) DEVICE — INTENDED FOR TISSUE SEPARATION, AND OTHER PROCEDURES THAT REQUIRE A SHARP SURGICAL BLADE TO PUNCTURE OR CUT.: Brand: BARD-PARKER ® CARBON RIB-BACK BLADES